# Patient Record
Sex: MALE | Race: WHITE | NOT HISPANIC OR LATINO
[De-identification: names, ages, dates, MRNs, and addresses within clinical notes are randomized per-mention and may not be internally consistent; named-entity substitution may affect disease eponyms.]

---

## 2017-01-25 ENCOUNTER — RX RENEWAL (OUTPATIENT)
Age: 72
End: 2017-01-25

## 2017-02-03 ENCOUNTER — APPOINTMENT (OUTPATIENT)
Dept: INTERNAL MEDICINE | Facility: CLINIC | Age: 72
End: 2017-02-03

## 2017-02-03 VITALS
HEIGHT: 71 IN | BODY MASS INDEX: 25.62 KG/M2 | RESPIRATION RATE: 14 BRPM | HEART RATE: 68 BPM | WEIGHT: 183 LBS | SYSTOLIC BLOOD PRESSURE: 130 MMHG | DIASTOLIC BLOOD PRESSURE: 80 MMHG

## 2017-02-03 DIAGNOSIS — Z87.891 PERSONAL HISTORY OF NICOTINE DEPENDENCE: ICD-10-CM

## 2017-04-21 ENCOUNTER — RX RENEWAL (OUTPATIENT)
Age: 72
End: 2017-04-21

## 2017-05-20 ENCOUNTER — EMERGENCY (EMERGENCY)
Facility: HOSPITAL | Age: 72
LOS: 1 days | Discharge: HOME | End: 2017-05-20
Attending: EMERGENCY MEDICINE | Admitting: EMERGENCY MEDICINE
Payer: COMMERCIAL

## 2017-05-20 ENCOUNTER — EMERGENCY (EMERGENCY)
Facility: HOSPITAL | Age: 72
LOS: 1 days | Discharge: HOME | End: 2017-05-20
Admitting: EMERGENCY MEDICINE
Payer: COMMERCIAL

## 2017-05-20 VITALS
RESPIRATION RATE: 16 BRPM | OXYGEN SATURATION: 100 % | DIASTOLIC BLOOD PRESSURE: 64 MMHG | SYSTOLIC BLOOD PRESSURE: 115 MMHG | HEART RATE: 62 BPM

## 2017-05-20 DIAGNOSIS — S80.11XA CONTUSION OF RIGHT LOWER LEG, INITIAL ENCOUNTER: ICD-10-CM

## 2017-05-20 DIAGNOSIS — R07.9 CHEST PAIN, UNSPECIFIED: ICD-10-CM

## 2017-05-20 DIAGNOSIS — I25.10 ATHEROSCLEROTIC HEART DISEASE OF NATIVE CORONARY ARTERY WITHOUT ANGINA PECTORIS: ICD-10-CM

## 2017-05-20 DIAGNOSIS — Z79.82 LONG TERM (CURRENT) USE OF ASPIRIN: ICD-10-CM

## 2017-05-20 DIAGNOSIS — K21.9 GASTRO-ESOPHAGEAL REFLUX DISEASE WITHOUT ESOPHAGITIS: ICD-10-CM
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
## 2017-05-20 DIAGNOSIS — I10 ESSENTIAL (PRIMARY) HYPERTENSION: ICD-10-CM

## 2017-05-20 DIAGNOSIS — R55 SYNCOPE AND COLLAPSE: ICD-10-CM

## 2017-05-20 DIAGNOSIS — M79.89 OTHER SPECIFIED SOFT TISSUE DISORDERS: ICD-10-CM

## 2017-05-20 DIAGNOSIS — E78.00 PURE HYPERCHOLESTEROLEMIA, UNSPECIFIED: ICD-10-CM
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
## 2017-05-20 DIAGNOSIS — S06.0X0A CONCUSSION WITHOUT LOSS OF CONSCIOUSNESS, INITIAL ENCOUNTER: ICD-10-CM

## 2017-05-20 DIAGNOSIS — F17.200 NICOTINE DEPENDENCE, UNSPECIFIED, UNCOMPLICATED: ICD-10-CM
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
## 2017-05-20 LAB
BASOPHILS # BLD AUTO: 0 K/UL — SIGNIFICANT CHANGE UP (ref 0–0.2)
BASOPHILS NFR BLD AUTO: 0.2 % — SIGNIFICANT CHANGE UP (ref 0–2)
CK MB BLD-MCNC: 1.7 % — SIGNIFICANT CHANGE UP (ref 0–3.5)
CK MB CFR SERPL CALC: 4.6 NG/ML — SIGNIFICANT CHANGE UP (ref 0–6.7)
CK SERPL-CCNC: 271 U/L — HIGH (ref 30–200)
EOSINOPHIL # BLD AUTO: 0.1 K/UL — SIGNIFICANT CHANGE UP (ref 0–0.5)
EOSINOPHIL NFR BLD AUTO: 1 % — SIGNIFICANT CHANGE UP (ref 0–6)
HCT VFR BLD CALC: 29 % — LOW (ref 39–50)
HGB BLD-MCNC: 9.9 G/DL — LOW (ref 13–17)
LYMPHOCYTES # BLD AUTO: 0.8 K/UL — LOW (ref 1–3.3)
LYMPHOCYTES # BLD AUTO: 14.5 % — SIGNIFICANT CHANGE UP (ref 13–44)
MCHC RBC-ENTMCNC: 33.5 PG — SIGNIFICANT CHANGE UP (ref 27–34)
MCHC RBC-ENTMCNC: 34.1 GM/DL — SIGNIFICANT CHANGE UP (ref 32–36)
MCV RBC AUTO: 98.1 FL — SIGNIFICANT CHANGE UP (ref 80–100)
MONOCYTES # BLD AUTO: 0.6 K/UL — SIGNIFICANT CHANGE UP (ref 0–0.9)
MONOCYTES NFR BLD AUTO: 10.8 % — SIGNIFICANT CHANGE UP (ref 2–14)
NEUTROPHILS # BLD AUTO: 3.9 K/UL — SIGNIFICANT CHANGE UP (ref 1.8–7.4)
NEUTROPHILS NFR BLD AUTO: 73.4 % — SIGNIFICANT CHANGE UP (ref 43–77)
PLATELET # BLD AUTO: 124 K/UL — LOW (ref 150–400)
RBC # BLD: 2.96 M/UL — LOW (ref 4.2–5.8)
RBC # FLD: 13.2 % — SIGNIFICANT CHANGE UP (ref 10.3–14.5)
TROPONIN T SERPL-MCNC: <0.01 NG/ML — SIGNIFICANT CHANGE UP (ref 0–0.06)
WBC # BLD: 5.3 K/UL — SIGNIFICANT CHANGE UP (ref 3.8–10.5)
WBC # FLD AUTO: 5.3 K/UL — SIGNIFICANT CHANGE UP (ref 3.8–10.5)

## 2017-05-20 PROCEDURE — 71010: CPT | Mod: 26

## 2017-05-20 PROCEDURE — 99291 CRITICAL CARE FIRST HOUR: CPT

## 2017-05-20 PROCEDURE — 72170 X-RAY EXAM OF PELVIS: CPT | Mod: 26

## 2017-05-20 PROCEDURE — 72125 CT NECK SPINE W/O DYE: CPT | Mod: 26

## 2017-05-20 PROCEDURE — 74177 CT ABD & PELVIS W/CONTRAST: CPT | Mod: 26

## 2017-05-20 PROCEDURE — 93010 ELECTROCARDIOGRAM REPORT: CPT

## 2017-05-20 PROCEDURE — 70450 CT HEAD/BRAIN W/O DYE: CPT | Mod: 26

## 2017-05-20 PROCEDURE — 99220: CPT | Mod: 25

## 2017-05-20 PROCEDURE — 71260 CT THORAX DX C+: CPT | Mod: 26

## 2017-05-20 RX ORDER — SODIUM CHLORIDE 9 MG/ML
3 INJECTION INTRAMUSCULAR; INTRAVENOUS; SUBCUTANEOUS EVERY 12 HOURS
Qty: 0 | Refills: 0 | Status: DISCONTINUED | OUTPATIENT
Start: 2017-05-20 | End: 2017-05-24

## 2017-05-20 RX ORDER — ACETAMINOPHEN 500 MG
1000 TABLET ORAL ONCE
Qty: 0 | Refills: 0 | Status: COMPLETED | OUTPATIENT
Start: 2017-05-20 | End: 2017-05-20

## 2017-05-20 RX ORDER — PANTOPRAZOLE SODIUM 20 MG/1
40 TABLET, DELAYED RELEASE ORAL
Qty: 0 | Refills: 0 | Status: DISCONTINUED | OUTPATIENT
Start: 2017-05-20 | End: 2017-05-24

## 2017-05-20 RX ORDER — OXYCODONE HYDROCHLORIDE 5 MG/1
5 TABLET ORAL EVERY 6 HOURS
Qty: 0 | Refills: 0 | Status: DISCONTINUED | OUTPATIENT
Start: 2017-05-20 | End: 2017-05-21

## 2017-05-20 RX ORDER — ATORVASTATIN CALCIUM 80 MG/1
40 TABLET, FILM COATED ORAL AT BEDTIME
Qty: 0 | Refills: 0 | Status: DISCONTINUED | OUTPATIENT
Start: 2017-05-20 | End: 2017-05-24

## 2017-05-20 RX ADMIN — ATORVASTATIN CALCIUM 40 MILLIGRAM(S): 80 TABLET, FILM COATED ORAL at 21:46

## 2017-05-20 RX ADMIN — Medication 1 TABLET(S): at 21:46

## 2017-05-20 RX ADMIN — Medication 1000 MILLIGRAM(S): at 18:55

## 2017-05-20 RX ADMIN — OXYCODONE HYDROCHLORIDE 5 MILLIGRAM(S): 5 TABLET ORAL at 23:51

## 2017-05-20 RX ADMIN — Medication 400 MILLIGRAM(S): at 18:15

## 2017-05-20 RX ADMIN — SODIUM CHLORIDE 3 MILLILITER(S): 9 INJECTION INTRAMUSCULAR; INTRAVENOUS; SUBCUTANEOUS at 22:28

## 2017-05-20 RX ADMIN — OXYCODONE HYDROCHLORIDE 5 MILLIGRAM(S): 5 TABLET ORAL at 22:27

## 2017-05-20 NOTE — ED CDU PROVIDER NOTE - PROGRESS NOTE DETAILS
CDU NOTE PEPE See: pt resting comfortably, pain controlled after most recent pain medication. no dizziness/cp/sob/weakness. NAD VSS. no events on tele. R side hematoma/ecchymosis noted, pt given ice packs to apply. CDU NOTE PEPE See: pt resting comfortably, c/o pain at hematoma site. NAD VSS. no events on tele. pain medication ordered. CDU NOTE PEPE See: pt resting oxycodone helped with pain, but now reports 5/10 pain at hematoma site. NAD VSS. no events on tele. ambulating well without issue. tylenol ordered. CDU NOTE PEPE See: pt resting c/o generalized soreness at R gluteal area/hip area at area of ecchymosis. NAD. swelling and ecchymosis to R gluteal area and side area unchanged. pain medication ordered. CDU NOTE PA Mayi: pt asleep. NAD VSS. Patient resting in bed comfortably. No distress, no complaints. Vital Signs Stable. No events on telemetry monitor. CARLOS Cullen Attending MD Lozada: I have personally performed a face to face diagnostic evaluation on this patient.  I have reviewed the ACP note and agree with the history, exam, and plan of care, except as noted.  71M with PMH including CAD s/p stent, spinal fusion surgery in the CDU for serial Hbs, trops and monitoring after syncopal episode at [facility] during an evaluation for a fall from a horse.  Reports that he was horseback riding wearing a helmet when he fell from the horse onto his R buttock.  Reports he ambulated after incident and it wasn't until he noted that his pants were tight on the R side and his wife saw a bruise on his R hip that he decided to go to the ED.  Reports CTs were completed and during workup when went to stand, saw flashing lights, became lightheaded, family reports raymond to 30, hypotensive and syncopized.  Was given Atropine and transferred to [facility].  Here has persistent R hip/buttock pain but denies chest pain, shortness of breath, palpitations.  Denies headache, change in vision, tinnitus.  Denies abdominal pain, nausea, vomiting, diarrhea, blood in stools. Denies loss of urinary or bowel continence. On monitor overnight,  min HR 50s. On exam, NAD, resting comfortably, head NCAT, PERRL, FROM at neck, no tenderness to palpation or stepoffs along length of spine, well healed surgical scar in lower back, lungs CTAB with good inspiratory effort, no crepitus or ecchymosis of chest, +S1S2, no m/r/g, abdomen soft with +BS, NT, ND, no CVAT, no ecchymosis including periumbilically, moving all extremities with 5/5 strength bilateral upper and lower extremities, good and equal  strength bilaterally, +ecchymosis at R hip extending into R lower flank, +tenderness to palpation and edema to the same area; Plan: Extensive return to ED instructions given, patient to follow up with his PMD, his cardiologist and his hematologist.  oing wello acute issues at  this time.  Lab and radiology tests reviewed with patient and family.  Patient stable for discharge. Attending MD Lozada: (CONTINUED)   Attending MD Lozada: Patient doing well, eager to leave.  No acute issues at  this time.  Lab and radiology tests reviewed with patient and family.  Patient stable for discharge. Follow up instructions given, importance of follow up emphasized, return to ED parameters reviewed and patient verbalized understanding.  All questions answered, all concerns addressed. Attending MD Lozada: I have personally performed a face to face diagnostic evaluation on this patient.  I have reviewed the ACP note and agree with the history, exam, and plan of care, except as noted.  71M with PMH including CAD s/p stent, spinal fusion surgery in the CDU for serial Hbs, trops and monitoring after syncopal episode at [facility] during an evaluation for a fall from a horse.  Reports that he was horseback riding wearing a helmet when he fell from the horse onto his R buttock.  Reports he ambulated after incident and it wasn't until he noted that his pants were tight on the R side and his wife saw a bruise on his R hip that he decided to go to the ED.  Reports CTs were completed and during workup when went to stand, saw flashing lights, became lightheaded, family reports raymond to 30, hypotensive and syncopized.  Was given Atropine and transferred to [facility].  Here has persistent R hip/buttock pain but denies chest pain, shortness of breath, palpitations.  Denies headache, change in vision, tinnitus.  Denies abdominal pain, nausea, vomiting, diarrhea, blood in stools. Denies loss of urinary or bowel continence. On monitor overnight,  min HR 50s. On exam, NAD, resting comfortably, head NCAT, PERRL, FROM at neck, no tenderness to palpation or stepoffs along length of spine, well healed surgical scar in lower back, lungs CTAB with good inspiratory effort, no crepitus or ecchymosis of chest, +S1S2, no m/r/g, abdomen soft with +BS, NT, ND, no CVAT, no ecchymosis including periumbilically, moving all extremities with 5/5 strength bilateral upper and lower extremities, good and equal  strength bilaterally, +ecchymosis at R hip extending into R lower flank, +tenderness to palpation and edema to the same area; Plan: Extensive return to ED instructions given, patient to follow up with his PMD, his cardiologist and his hematologist.

## 2017-05-20 NOTE — ED ADULT NURSE REASSESSMENT NOTE - NS ED NURSE REASSESS COMMENT FT1
Received pt from [facility], received pt alert and responsive, oriented x4, denies any respiratory distress, SOB, or difficulty breathing. Pt transferred to CDU for observation for fall/ syncopy, pt currently denies CP, SOB, diaphoresis, dizziness, lightheadedness, N/V, F/C, denies heart palpitations at this time. Neuro intact. Ecchymosis noted to R flank/back. Pt pending 2nd CE with EKG and CBC at 12am, will closely monitor on telemetry pt is currently SR on tele with hr in the 70's. IV in place, patent and free of signs of infiltration, V/S stable, pt afebrile, pt denies pain at this time. Pt educated on unit and unit rules, instructed patient to notify RN of any needed assistance, Pt verbalizes understanding, Call bell placed within reach. Safety maintained. Will continue to monitor. Daughter and spouse at bedside, meal provided.

## 2017-05-20 NOTE — ED PROVIDER NOTE - OBJECTIVE STATEMENT
This is a 71M with a history of CAD s/p 3x stents, GERD, HLD, Spinal fusion, and OA of R shoulder who presents from [place] after episode of bradycardia and hypotension. Pt reports that this morning went horseback riding and fell off his horse on to his behind and may have hit the back of his head. He reports wearing a helmet. Pt states that after he fell he could move all extremities and got back onto horse. After he went home he started to feel pain on his R hip and L chest pain and then went to [place]. There he had numerous scans which mainly showed Hematoma of R hip/buttock. While there he sat up and became bradycardic, hypotensive and required atropine and was transferred to [facility] for further evaluation. Pt now reports continued hip and chest pain. 5/10 which he attributes to fall. He denies nausea, vomiting, fevers, chills, abdominal pain.     PMD.

## 2017-05-20 NOTE — ED CDU PROVIDER NOTE - OBJECTIVE STATEMENT
71M with a history of CAD s/p 3x stents, GERD, HLD, Spinal fusion, and OA of R shoulder. This morning he went horseback riding and fell off his horse on to his right buttock and may have hit the back of his head. He reports wearing a helmet. Pt states that after he fell he could move all extremities and got back onto horse. After he went home he started to feel pain on his R hip and L chest pain and then went to [place] [facility]. There he had numerous scans, including head CT which mainly showed Hematoma of R hip/buttock. While there he sat up and became bradycardic, diaphoretic, pale, hypotensive and required atropine and was transferred to [facility] for further evaluation. Pt now reports continued hip and chest pain. 5/10 which he attributes to fall. He denies nausea, vomiting, fevers, chills, abdominal pain.

## 2017-05-20 NOTE — ED CDU PROVIDER NOTE - PLAN OF CARE
1. Stay hydrated. apply ice to areas of injury 20 mins on, 40 mins off in cycle.   2. Continue Current Home Medications. Take Tylenol 650mg every 4-6hrs for pain as needed. Take oxycodone 1 tab every 6hrs for severe pain as needed- don't drive after.   3. Follow up with your PCP in 1-2 days (Bring printed results to your doctor visit).  4. Return if symptoms, worsen, fever, weakness, chest pain, difficulty breathing, dizziness and all other concerns.

## 2017-05-20 NOTE — ED PROVIDER NOTE - MEDICAL DECISION MAKING DETAILS
71M with CAD, HLD, GERD sent from [place] after trauma workup. Pt apparently fell today off the horse and has R gluteal hematoma. he had multiple CT scans including abdomen, head and neck with xrays which were all negative apparently had episode of symptomatic bradycardia and transferred to [facility] for evaluation. Pt is assymptomatic. EKG shows sinus raymond @ 54bpm. No chest pain or SOB. Will recheck CBC and Trop. Pt to go to CDU for observation. ZR

## 2017-05-20 NOTE — ED ADULT NURSE NOTE - PSH
Cataract    Hernia, Inguinal    Prostate  Green light laser 8/8/11  Status Post Lumbar Spinal Fusion    Stented Coronary Artery

## 2017-05-20 NOTE — ED ADULT NURSE NOTE - CHPI ED SYMPTOMS NEG
no shortness of breath/no vomiting/no cough/no nausea/no back pain/no fever/no chills/no diaphoresis/no dizziness

## 2017-05-20 NOTE — ED ADULT NURSE NOTE - OBJECTIVE STATEMENT
Male 71 years old with history of Spinal Fusion, 3 cardiac stents was brought in by EMS from [facility] s/p fall from  a horse this morning. Denies LOC, N/V, headache and dizziness and change of vision. Pt stated he went home after the fall, but after a few hours he felt hip/back pain, chest pain and funny feeling of his head. Went to [place] ER and his HR was only 40 and BP was very low. He was given NS 2 liters and Atropine 0.5 mg. +right hip tenderness and hematoma. PERRL. Alert and orientedx3. All extremities mobile. Will monitor.

## 2017-05-20 NOTE — ED CDU PROVIDER NOTE - MEDICAL DECISION MAKING DETAILS
71M with CAD, HLD, GERD sent from [place] after trauma workup. Pt apparently fell today off the horse and has R gluteal hematoma. he had multiple CT scans including abdomen, head and neck with xrays which were all negative apparently had episode of symptomatic bradycardia and transferred to [facility] for evaluation. Pt is assymptomatic. EKG shows sinus raymond @ 54bpm. No chest pain or SOB. Will admit to CDU for observation with tele. Will repeat another Trop and h/h and reevaluate ZR

## 2017-05-21 VITALS
RESPIRATION RATE: 17 BRPM | SYSTOLIC BLOOD PRESSURE: 125 MMHG | TEMPERATURE: 98 F | DIASTOLIC BLOOD PRESSURE: 66 MMHG | HEART RATE: 60 BPM | OXYGEN SATURATION: 97 %

## 2017-05-21 LAB
HCT VFR BLD CALC: 28.6 % — LOW (ref 39–50)
HCT VFR BLD CALC: 28.8 % — LOW (ref 39–50)
HGB BLD-MCNC: 9.7 G/DL — LOW (ref 13–17)
HGB BLD-MCNC: 9.9 G/DL — LOW (ref 13–17)
MCHC RBC-ENTMCNC: 33.4 PG — SIGNIFICANT CHANGE UP (ref 27–34)
MCHC RBC-ENTMCNC: 33.6 GM/DL — SIGNIFICANT CHANGE UP (ref 32–36)
MCHC RBC-ENTMCNC: 34.2 PG — HIGH (ref 27–34)
MCHC RBC-ENTMCNC: 34.6 GM/DL — SIGNIFICANT CHANGE UP (ref 32–36)
MCV RBC AUTO: 98.8 FL — SIGNIFICANT CHANGE UP (ref 80–100)
MCV RBC AUTO: 99.3 FL — SIGNIFICANT CHANGE UP (ref 80–100)
PLATELET # BLD AUTO: 112 K/UL — LOW (ref 150–400)
PLATELET # BLD AUTO: 115 K/UL — LOW (ref 150–400)
RBC # BLD: 2.9 M/UL — LOW (ref 4.2–5.8)
RBC # BLD: 2.9 M/UL — LOW (ref 4.2–5.8)
RBC # FLD: 13.3 % — SIGNIFICANT CHANGE UP (ref 10.3–14.5)
RBC # FLD: 13.3 % — SIGNIFICANT CHANGE UP (ref 10.3–14.5)
TROPONIN T SERPL-MCNC: <0.01 NG/ML — SIGNIFICANT CHANGE UP (ref 0–0.06)
WBC # BLD: 4 K/UL — SIGNIFICANT CHANGE UP (ref 3.8–10.5)
WBC # BLD: 4.2 K/UL — SIGNIFICANT CHANGE UP (ref 3.8–10.5)
WBC # FLD AUTO: 4 K/UL — SIGNIFICANT CHANGE UP (ref 3.8–10.5)
WBC # FLD AUTO: 4.2 K/UL — SIGNIFICANT CHANGE UP (ref 3.8–10.5)

## 2017-05-21 PROCEDURE — 82550 ASSAY OF CK (CPK): CPT

## 2017-05-21 PROCEDURE — 80048 BASIC METABOLIC PNL TOTAL CA: CPT

## 2017-05-21 PROCEDURE — 71045 X-RAY EXAM CHEST 1 VIEW: CPT

## 2017-05-21 PROCEDURE — 84484 ASSAY OF TROPONIN QUANT: CPT

## 2017-05-21 PROCEDURE — 96361 HYDRATE IV INFUSION ADD-ON: CPT

## 2017-05-21 PROCEDURE — 83874 ASSAY OF MYOGLOBIN: CPT

## 2017-05-21 PROCEDURE — 74177 CT ABD & PELVIS W/CONTRAST: CPT

## 2017-05-21 PROCEDURE — 85610 PROTHROMBIN TIME: CPT

## 2017-05-21 PROCEDURE — 99284 EMERGENCY DEPT VISIT MOD MDM: CPT | Mod: 25

## 2017-05-21 PROCEDURE — 72125 CT NECK SPINE W/O DYE: CPT

## 2017-05-21 PROCEDURE — 96374 THER/PROPH/DIAG INJ IV PUSH: CPT

## 2017-05-21 PROCEDURE — 82962 GLUCOSE BLOOD TEST: CPT

## 2017-05-21 PROCEDURE — 81003 URINALYSIS AUTO W/O SCOPE: CPT

## 2017-05-21 PROCEDURE — 82553 CREATINE MB FRACTION: CPT

## 2017-05-21 PROCEDURE — 85730 THROMBOPLASTIN TIME PARTIAL: CPT

## 2017-05-21 PROCEDURE — 96374 THER/PROPH/DIAG INJ IV PUSH: CPT | Mod: XU

## 2017-05-21 PROCEDURE — 70450 CT HEAD/BRAIN W/O DYE: CPT

## 2017-05-21 PROCEDURE — 85027 COMPLETE CBC AUTOMATED: CPT

## 2017-05-21 PROCEDURE — G0378: CPT

## 2017-05-21 PROCEDURE — 93005 ELECTROCARDIOGRAM TRACING: CPT

## 2017-05-21 PROCEDURE — 99217: CPT

## 2017-05-21 PROCEDURE — 96375 TX/PRO/DX INJ NEW DRUG ADDON: CPT | Mod: XU

## 2017-05-21 PROCEDURE — 99291 CRITICAL CARE FIRST HOUR: CPT | Mod: 25

## 2017-05-21 PROCEDURE — 72170 X-RAY EXAM OF PELVIS: CPT

## 2017-05-21 PROCEDURE — 71260 CT THORAX DX C+: CPT

## 2017-05-21 RX ORDER — ACETAMINOPHEN 500 MG
975 TABLET ORAL EVERY 6 HOURS
Qty: 0 | Refills: 0 | Status: DISCONTINUED | OUTPATIENT
Start: 2017-05-21 | End: 2017-05-24

## 2017-05-21 RX ORDER — OXYCODONE HYDROCHLORIDE 5 MG/1
5 TABLET ORAL ONCE
Qty: 0 | Refills: 0 | Status: DISCONTINUED | OUTPATIENT
Start: 2017-05-21 | End: 2017-05-21

## 2017-05-21 RX ORDER — OXYCODONE HYDROCHLORIDE 5 MG/1
1 TABLET ORAL
Qty: 9 | Refills: 0 | OUTPATIENT
Start: 2017-05-21

## 2017-05-21 RX ADMIN — OXYCODONE HYDROCHLORIDE 5 MILLIGRAM(S): 5 TABLET ORAL at 03:09

## 2017-05-21 RX ADMIN — OXYCODONE HYDROCHLORIDE 5 MILLIGRAM(S): 5 TABLET ORAL at 07:28

## 2017-05-21 RX ADMIN — OXYCODONE HYDROCHLORIDE 5 MILLIGRAM(S): 5 TABLET ORAL at 04:09

## 2017-05-21 RX ADMIN — Medication 975 MILLIGRAM(S): at 00:51

## 2017-05-22 ENCOUNTER — APPOINTMENT (OUTPATIENT)
Dept: INTERNAL MEDICINE | Facility: CLINIC | Age: 72
End: 2017-05-22

## 2017-05-22 ENCOUNTER — TRANSCRIPTION ENCOUNTER (OUTPATIENT)
Age: 72
End: 2017-05-22

## 2017-05-22 VITALS
HEART RATE: 70 BPM | BODY MASS INDEX: 25.76 KG/M2 | DIASTOLIC BLOOD PRESSURE: 74 MMHG | HEIGHT: 71 IN | RESPIRATION RATE: 14 BRPM | SYSTOLIC BLOOD PRESSURE: 127 MMHG | TEMPERATURE: 98.3 F | WEIGHT: 184 LBS

## 2017-05-25 ENCOUNTER — EMERGENCY (EMERGENCY)
Facility: HOSPITAL | Age: 72
LOS: 1 days | Discharge: HOME | End: 2017-05-25
Admitting: EMERGENCY MEDICINE
Payer: COMMERCIAL

## 2017-05-25 DIAGNOSIS — Z79.82 LONG TERM (CURRENT) USE OF ASPIRIN: ICD-10-CM

## 2017-05-25 DIAGNOSIS — S30.0XXA CONTUSION OF LOWER BACK AND PELVIS, INITIAL ENCOUNTER: ICD-10-CM
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
## 2017-05-25 DIAGNOSIS — I25.10 ATHEROSCLEROTIC HEART DISEASE OF NATIVE CORONARY ARTERY WITHOUT ANGINA PECTORIS: ICD-10-CM

## 2017-05-25 DIAGNOSIS — D64.9 ANEMIA, UNSPECIFIED: ICD-10-CM
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
## 2017-05-25 DIAGNOSIS — Z87.891 PERSONAL HISTORY OF NICOTINE DEPENDENCE: ICD-10-CM

## 2017-05-25 DIAGNOSIS — R53.1 WEAKNESS: ICD-10-CM

## 2017-05-25 DIAGNOSIS — K21.9 GASTRO-ESOPHAGEAL REFLUX DISEASE WITHOUT ESOPHAGITIS: ICD-10-CM

## 2017-05-25 DIAGNOSIS — E78.00 PURE HYPERCHOLESTEROLEMIA, UNSPECIFIED: ICD-10-CM

## 2017-05-25 DIAGNOSIS — S06.0X0A CONCUSSION WITHOUT LOSS OF CONSCIOUSNESS, INITIAL ENCOUNTER: ICD-10-CM

## 2017-05-25 DIAGNOSIS — E78.5 HYPERLIPIDEMIA, UNSPECIFIED: ICD-10-CM
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
## 2017-05-25 PROCEDURE — 81003 URINALYSIS AUTO W/O SCOPE: CPT

## 2017-05-25 PROCEDURE — 85027 COMPLETE CBC AUTOMATED: CPT

## 2017-05-25 PROCEDURE — 86900 BLOOD TYPING SEROLOGIC ABO: CPT

## 2017-05-25 PROCEDURE — 71260 CT THORAX DX C+: CPT | Mod: 26

## 2017-05-25 PROCEDURE — 70450 CT HEAD/BRAIN W/O DYE: CPT

## 2017-05-25 PROCEDURE — 74177 CT ABD & PELVIS W/CONTRAST: CPT | Mod: 26

## 2017-05-25 PROCEDURE — 93005 ELECTROCARDIOGRAM TRACING: CPT

## 2017-05-25 PROCEDURE — 36415 COLL VENOUS BLD VENIPUNCTURE: CPT

## 2017-05-25 PROCEDURE — 85610 PROTHROMBIN TIME: CPT

## 2017-05-25 PROCEDURE — 71260 CT THORAX DX C+: CPT

## 2017-05-25 PROCEDURE — 93010 ELECTROCARDIOGRAM REPORT: CPT

## 2017-05-25 PROCEDURE — 99285 EMERGENCY DEPT VISIT HI MDM: CPT | Mod: 25

## 2017-05-25 PROCEDURE — 86850 RBC ANTIBODY SCREEN: CPT

## 2017-05-25 PROCEDURE — 83690 ASSAY OF LIPASE: CPT

## 2017-05-25 PROCEDURE — 80053 COMPREHEN METABOLIC PANEL: CPT

## 2017-05-25 PROCEDURE — 85730 THROMBOPLASTIN TIME PARTIAL: CPT

## 2017-05-25 PROCEDURE — 70450 CT HEAD/BRAIN W/O DYE: CPT | Mod: 26

## 2017-05-25 PROCEDURE — 74177 CT ABD & PELVIS W/CONTRAST: CPT

## 2017-05-25 PROCEDURE — 82150 ASSAY OF AMYLASE: CPT

## 2017-05-25 PROCEDURE — 99284 EMERGENCY DEPT VISIT MOD MDM: CPT | Mod: 25

## 2017-05-26 ENCOUNTER — APPOINTMENT (OUTPATIENT)
Dept: INTERNAL MEDICINE | Facility: CLINIC | Age: 72
End: 2017-05-26

## 2017-05-26 VITALS
RESPIRATION RATE: 14 BRPM | SYSTOLIC BLOOD PRESSURE: 128 MMHG | WEIGHT: 185 LBS | DIASTOLIC BLOOD PRESSURE: 76 MMHG | HEIGHT: 71 IN | HEART RATE: 72 BPM | BODY MASS INDEX: 25.9 KG/M2

## 2017-05-26 RX ORDER — OXYCODONE 5 MG/1
5 TABLET ORAL
Qty: 9 | Refills: 0 | Status: COMPLETED | COMMUNITY
Start: 2017-05-21

## 2017-05-26 RX ORDER — SULFAMETHOXAZOLE AND TRIMETHOPRIM 400; 80 MG/1; MG/1
400-80 TABLET ORAL
Refills: 0 | Status: DISCONTINUED | COMMUNITY
End: 2017-05-26

## 2017-05-26 RX ORDER — SULFAMETHOXAZOLE AND TRIMETHOPRIM 800; 160 MG/1; MG/1
800-160 TABLET ORAL
Qty: 30 | Refills: 0 | Status: COMPLETED | COMMUNITY
Start: 2016-03-08

## 2017-05-26 RX ORDER — EFINACONAZOLE 100 MG/ML
10 SOLUTION TOPICAL
Qty: 4 | Refills: 0 | Status: COMPLETED | COMMUNITY
Start: 2015-12-23

## 2017-06-19 ENCOUNTER — LABORATORY RESULT (OUTPATIENT)
Age: 72
End: 2017-06-19

## 2017-06-26 ENCOUNTER — APPOINTMENT (OUTPATIENT)
Dept: INTERNAL MEDICINE | Facility: CLINIC | Age: 72
End: 2017-06-26

## 2017-06-26 VITALS
HEIGHT: 71 IN | DIASTOLIC BLOOD PRESSURE: 78 MMHG | SYSTOLIC BLOOD PRESSURE: 130 MMHG | RESPIRATION RATE: 14 BRPM | BODY MASS INDEX: 25.9 KG/M2 | HEART RATE: 76 BPM | WEIGHT: 185 LBS

## 2017-06-26 RX ORDER — OXYCODONE AND ACETAMINOPHEN 5; 325 MG/1; MG/1
5-325 TABLET ORAL
Qty: 30 | Refills: 0 | Status: DISCONTINUED | COMMUNITY
Start: 2017-02-03 | End: 2017-06-26

## 2017-07-16 ENCOUNTER — RX RENEWAL (OUTPATIENT)
Age: 72
End: 2017-07-16

## 2017-07-24 ENCOUNTER — APPOINTMENT (OUTPATIENT)
Dept: INTERNAL MEDICINE | Facility: CLINIC | Age: 72
End: 2017-07-24

## 2017-07-24 VITALS
WEIGHT: 186 LBS | DIASTOLIC BLOOD PRESSURE: 74 MMHG | HEART RATE: 78 BPM | RESPIRATION RATE: 14 BRPM | BODY MASS INDEX: 26.04 KG/M2 | HEIGHT: 71 IN | SYSTOLIC BLOOD PRESSURE: 128 MMHG

## 2017-07-24 DIAGNOSIS — R49.0 DYSPHONIA: ICD-10-CM

## 2017-08-07 ENCOUNTER — APPOINTMENT (OUTPATIENT)
Dept: RADIOLOGY | Facility: HOSPITAL | Age: 72
End: 2017-08-07

## 2017-08-08 ENCOUNTER — OUTPATIENT (OUTPATIENT)
Dept: OUTPATIENT SERVICES | Facility: HOSPITAL | Age: 72
LOS: 1 days | End: 2017-08-08
Payer: COMMERCIAL

## 2017-08-08 ENCOUNTER — APPOINTMENT (OUTPATIENT)
Dept: MRI IMAGING | Facility: HOSPITAL | Age: 72
End: 2017-08-08
Payer: COMMERCIAL

## 2017-08-08 DIAGNOSIS — M75.121 COMPLETE ROTATOR CUFF TEAR OR RUPTURE OF RIGHT SHOULDER, NOT SPECIFIED AS TRAUMATIC: ICD-10-CM

## 2017-08-08 PROCEDURE — 73030 X-RAY EXAM OF SHOULDER: CPT

## 2017-08-08 PROCEDURE — 73221 MRI JOINT UPR EXTREM W/O DYE: CPT | Mod: 26,RT

## 2017-08-08 PROCEDURE — 73030 X-RAY EXAM OF SHOULDER: CPT | Mod: 26,LT

## 2017-08-08 PROCEDURE — 73221 MRI JOINT UPR EXTREM W/O DYE: CPT

## 2017-10-12 ENCOUNTER — APPOINTMENT (OUTPATIENT)
Dept: INTERNAL MEDICINE | Facility: CLINIC | Age: 72
End: 2017-10-12
Payer: COMMERCIAL

## 2017-10-12 VITALS
HEIGHT: 71 IN | WEIGHT: 187 LBS | DIASTOLIC BLOOD PRESSURE: 76 MMHG | HEART RATE: 80 BPM | RESPIRATION RATE: 15 BRPM | TEMPERATURE: 97.7 F | BODY MASS INDEX: 26.18 KG/M2 | SYSTOLIC BLOOD PRESSURE: 130 MMHG

## 2017-10-12 PROCEDURE — 99214 OFFICE O/P EST MOD 30 MIN: CPT

## 2017-10-17 ENCOUNTER — RX RENEWAL (OUTPATIENT)
Age: 72
End: 2017-10-17

## 2018-01-10 NOTE — ED CDU PROVIDER NOTE - PROGRESS NOTE3
MEDICATION REQUESTED: methylphenidate (QUILLIVANT XR) 25 MG/5ML extended-release solution    Last office visit: 11/6/2017    Next office visit: due 1/14/18    LAST REFILL: 12/1/17    
Stable.

## 2018-01-14 ENCOUNTER — RX RENEWAL (OUTPATIENT)
Age: 73
End: 2018-01-14

## 2018-04-05 ENCOUNTER — RX RENEWAL (OUTPATIENT)
Age: 73
End: 2018-04-05

## 2018-05-16 ENCOUNTER — APPOINTMENT (OUTPATIENT)
Dept: INTERNAL MEDICINE | Facility: CLINIC | Age: 73
End: 2018-05-16
Payer: COMMERCIAL

## 2018-05-16 VITALS
HEIGHT: 71 IN | DIASTOLIC BLOOD PRESSURE: 80 MMHG | HEART RATE: 76 BPM | RESPIRATION RATE: 15 BRPM | BODY MASS INDEX: 26.04 KG/M2 | WEIGHT: 186 LBS | SYSTOLIC BLOOD PRESSURE: 128 MMHG

## 2018-05-16 DIAGNOSIS — Z12.11 ENCOUNTER FOR SCREENING FOR MALIGNANT NEOPLASM OF COLON: ICD-10-CM

## 2018-05-16 PROCEDURE — 99215 OFFICE O/P EST HI 40 MIN: CPT

## 2018-05-16 RX ORDER — SULFAMETHOXAZOLE AND TRIMETHOPRIM 400; 80 MG/1; MG/1
400-80 TABLET ORAL
Refills: 0 | Status: DISCONTINUED | COMMUNITY
End: 2018-05-16

## 2018-05-16 RX ORDER — HYDROCODONE BITARTRATE AND HOMATROPINE METHYLBROMIDE 5; 1.5 MG/5ML; MG/5ML
5-1.5 SYRUP ORAL
Qty: 1 | Refills: 0 | Status: DISCONTINUED | COMMUNITY
Start: 2017-10-12 | End: 2018-05-16

## 2018-05-16 RX ORDER — FLUTICASONE PROPIONATE 50 UG/1
50 SPRAY, METERED NASAL DAILY
Qty: 1 | Refills: 3 | Status: DISCONTINUED | COMMUNITY
Start: 2017-10-12 | End: 2018-05-16

## 2018-05-16 NOTE — HISTORY OF PRESENT ILLNESS
[FreeTextEntry1] : Comes to the office for followup to review his medications discuss his overall health and to set up a colonoscopy and an upper endoscopy [de-identified] : 72-year-old man with a history of Cabrera's esophagus and GERD shoulder arthritis hyperlipidemia comes to the office for an encounter for setting up a screening colonoscopy and an upper endoscopy to reevaluate his Cabrera's acid reflux has been well controlled he has had no dysphasia no weight loss he denies abdominal pain nausea vomiting diarrhea constipation or rectal bleeding his appetite has been good and his weight has been stable he has had no chest pain shortness of breath wheezing exertional dyspnea lightheadedness dizziness vertigo or syncope he denies temperature chills sweats or myalgias

## 2018-05-16 NOTE — PHYSICAL EXAM
[No Acute Distress] : no acute distress [Well Nourished] : well nourished [Well Developed] : well developed [Well-Appearing] : well-appearing [Normal Voice/Communication] : normal voice/communication [Normal Sclera/Conjunctiva] : normal sclera/conjunctiva [PERRL] : pupils equal round and reactive to light [EOMI] : extraocular movements intact [Normal Outer Ear/Nose] : the outer ears and nose were normal in appearance [Normal Oropharynx] : the oropharynx was normal [Normal TMs] : both tympanic membranes were normal [Normal Nasal Mucosa] : the nasal mucosa was normal [No JVD] : no jugular venous distention [Supple] : supple [No Lymphadenopathy] : no lymphadenopathy [Thyroid Normal, No Nodules] : the thyroid was normal and there were no nodules present [No Respiratory Distress] : no respiratory distress  [Clear to Auscultation] : lungs were clear to auscultation bilaterally [No Accessory Muscle Use] : no accessory muscle use [Normal Rate] : normal rate  [Regular Rhythm] : with a regular rhythm [Normal S1, S2] : normal S1 and S2 [No Murmur] : no murmur heard [No Carotid Bruits] : no carotid bruits [No Abdominal Bruit] : a ~M bruit was not heard ~T in the abdomen [No Varicosities] : no varicosities [Pedal Pulses Present] : the pedal pulses are present [No Edema] : there was no peripheral edema [No Extremity Clubbing/Cyanosis] : no extremity clubbing/cyanosis [No Palpable Aorta] : no palpable aorta [Declined Breast Exam] : declined breast exam  [Soft] : abdomen soft [Non Tender] : non-tender [Non-distended] : non-distended [No Masses] : no abdominal mass palpated [No HSM] : no HSM [Normal Bowel Sounds] : normal bowel sounds [No Hernias] : no hernias [Declined Rectal Exam] : declined rectal exam [Normal Supraclavicular Nodes] : no supraclavicular lymphadenopathy [Normal Axillary Nodes] : no axillary lymphadenopathy [Normal Posterior Cervical Nodes] : no posterior cervical lymphadenopathy [Normal Femoral Nodes] : no femoral lymphadenopathy [Normal Anterior Cervical Nodes] : no anterior cervical lymphadenopathy [Normal Inguinal Nodes] : no inguinal lymphadenopathy [No CVA Tenderness] : no CVA  tenderness [No Spinal Tenderness] : no spinal tenderness [Kyphosis] : no kyphosis [Scoliosis] : no scoliosis [No Joint Swelling] : no joint swelling [Grossly Normal Strength/Tone] : grossly normal strength/tone [No Rash] : no rash [No Skin Lesions] : no skin lesions [Acne] : no acne [Normal Gait] : normal gait [Coordination Grossly Intact] : coordination grossly intact [No Focal Deficits] : no focal deficits [Deep Tendon Reflexes (DTR)] : deep tendon reflexes were 2+ and symmetric [Speech Grossly Normal] : speech grossly normal [Memory Grossly Normal] : memory grossly normal [Normal Affect] : the affect was normal [Alert and Oriented x3] : oriented to person, place, and time [Normal Mood] : the mood was normal [Normal Insight/Judgement] : insight and judgment were intact

## 2018-05-16 NOTE — ASSESSMENT
[FreeTextEntry1] : Physical exam shows a well-developed man in no acute distress his weight was 186 pounds his height was 5 foot 11 inches blood pressure 128/80 pulse is 76 and regular respiratory rate of 15 HEENT was unremarkable chest was clear cardiovascular exam shows normal S1 and S2 with no extra heart sounds or murmurs abdomen was soft and nontender extremities showed no clubbing cyanosis or edema neurologic exam was nonfocal.............. patient was given a MiraLax/Gatorade preparation for his upcoming colonoscopy the prep was reviewed and all his questions were answered he is aware of the need to have an escorted ride home stopped all blood thinning medicine for one week prior the risk of perforation and bleeding were described to the patient he is understanding we will also be doing an upper endoscopy to survey his Cabrera's esophagus with dysplasia

## 2018-05-16 NOTE — REVIEW OF SYSTEMS
[Negative] : Heme/Lymph [Nocturia] : nocturia [Joint Pain] : joint pain [Fever] : no fever [Chills] : no chills [Fatigue] : no fatigue [Recent Change In Weight] : ~T no recent weight change [Night Sweats] : no night sweats [Hot Flashes] : no hot flashes [Discharge] : no discharge [Pain] : no pain [Redness] : no redness [Dryness] : no dryness [Vision Problems] : no vision problems [Itching] : no itching [Earache] : no earache [Hearing Loss] : no hearing loss [Nosebleeds] : no nosebleeds [Hoarseness] : no hoarseness [Nasal Discharge] : no nasal discharge [Sore Throat] : no sore throat [Chest Pain] : no chest pain [Palpitations] : no palpitations [Leg Claudication] : no  leg claudication [Lower Ext Edema] : no lower extremity edema [Orthopena] : no orthopnea [Paroysmal Nocturnal Dyspnea] : no paroysmal nocturnal dyspnea [Shortness Of Breath] : no shortness of breath [Wheezing] : no wheezing [Cough] : no cough [Dyspnea on Exertion] : not dyspnea on exertion [Abdominal Pain] : no abdominal pain [Nausea] : no nausea [Constipation] : no constipation [Diarrhea] : no diarrhea [Vomiting] : no vomiting [Heartburn] : no heartburn [Melena] : no melena [Dysuria] : no dysuria [Incontinence] : no incontinence [Hesitancy] : no hesitancy [Hematuria] : no hematuria [Frequency] : no frequency [Impotence] : no impotency [Poor Libido] : libido not poor [Joint Stiffness] : no joint stiffness [Muscle Pain] : no muscle pain [Muscle Weakness] : no muscle weakness [Back Pain] : no back pain [Joint Swelling] : no joint swelling [Itching] : no itching [Mole Changes] : no mole changes [Nail Changes] : no nail changes [Hair Changes] : no hair changes [Skin Rash] : no skin rash [Headache] : no headache [Memory Loss] : no memory loss [Suicidal] : not suicidal [Insomnia] : no insomnia [Anxiety] : no anxiety [Depression] : no depression [Easy Bleeding] : no easy bleeding [Easy Bruising] : no easy bruising [Swollen Glands] : no swollen glands [FreeTextEntry9] : both shoulders

## 2018-06-04 ENCOUNTER — RX RENEWAL (OUTPATIENT)
Age: 73
End: 2018-06-04

## 2018-06-18 ENCOUNTER — RESULT REVIEW (OUTPATIENT)
Age: 73
End: 2018-06-18

## 2018-06-18 ENCOUNTER — OUTPATIENT (OUTPATIENT)
Dept: OUTPATIENT SERVICES | Facility: HOSPITAL | Age: 73
LOS: 1 days | End: 2018-06-18
Payer: COMMERCIAL

## 2018-06-18 ENCOUNTER — APPOINTMENT (OUTPATIENT)
Dept: INTERNAL MEDICINE | Facility: HOSPITAL | Age: 73
End: 2018-06-18
Payer: COMMERCIAL

## 2018-06-18 DIAGNOSIS — Z12.11 ENCOUNTER FOR SCREENING FOR MALIGNANT NEOPLASM OF COLON: ICD-10-CM

## 2018-06-18 PROCEDURE — 45378 DIAGNOSTIC COLONOSCOPY: CPT | Mod: 59

## 2018-06-18 PROCEDURE — 43239 EGD BIOPSY SINGLE/MULTIPLE: CPT

## 2018-06-18 PROCEDURE — G0105: CPT

## 2018-06-19 LAB — SURGICAL PATHOLOGY STUDY: SIGNIFICANT CHANGE UP

## 2018-07-17 ENCOUNTER — APPOINTMENT (OUTPATIENT)
Dept: ORTHOPEDIC SURGERY | Facility: CLINIC | Age: 73
End: 2018-07-17
Payer: COMMERCIAL

## 2018-07-17 VITALS
SYSTOLIC BLOOD PRESSURE: 121 MMHG | DIASTOLIC BLOOD PRESSURE: 79 MMHG | HEART RATE: 56 BPM | WEIGHT: 186 LBS | HEIGHT: 71 IN | BODY MASS INDEX: 26.04 KG/M2

## 2018-07-17 PROCEDURE — 99214 OFFICE O/P EST MOD 30 MIN: CPT | Mod: 25

## 2018-07-17 PROCEDURE — 20610 DRAIN/INJ JOINT/BURSA W/O US: CPT | Mod: 50

## 2018-10-01 ENCOUNTER — APPOINTMENT (OUTPATIENT)
Dept: ORTHOPEDIC SURGERY | Facility: CLINIC | Age: 73
End: 2018-10-01
Payer: COMMERCIAL

## 2018-10-01 VITALS — HEIGHT: 71 IN | BODY MASS INDEX: 25.9 KG/M2 | WEIGHT: 185 LBS

## 2018-10-01 PROCEDURE — 99214 OFFICE O/P EST MOD 30 MIN: CPT | Mod: 25

## 2018-10-01 PROCEDURE — 20610 DRAIN/INJ JOINT/BURSA W/O US: CPT | Mod: 50

## 2018-11-21 ENCOUNTER — EMERGENCY (EMERGENCY)
Facility: HOSPITAL | Age: 73
LOS: 1 days | Discharge: ANOTHER HOSPITAL | End: 2018-11-21
Attending: EMERGENCY MEDICINE | Admitting: EMERGENCY MEDICINE
Payer: COMMERCIAL

## 2018-11-21 ENCOUNTER — INPATIENT (INPATIENT)
Facility: HOSPITAL | Age: 73
LOS: 0 days | Discharge: HOME | DRG: 605 | End: 2018-11-22
Attending: SURGERY | Admitting: SURGERY
Payer: COMMERCIAL

## 2018-11-21 VITALS
SYSTOLIC BLOOD PRESSURE: 130 MMHG | HEART RATE: 58 BPM | OXYGEN SATURATION: 100 % | RESPIRATION RATE: 16 BRPM | DIASTOLIC BLOOD PRESSURE: 75 MMHG

## 2018-11-21 VITALS
OXYGEN SATURATION: 100 % | HEART RATE: 66 BPM | RESPIRATION RATE: 17 BRPM | TEMPERATURE: 96 F | SYSTOLIC BLOOD PRESSURE: 147 MMHG | DIASTOLIC BLOOD PRESSURE: 92 MMHG | HEIGHT: 71 IN | WEIGHT: 184.97 LBS

## 2018-11-21 VITALS
OXYGEN SATURATION: 100 % | HEART RATE: 64 BPM | SYSTOLIC BLOOD PRESSURE: 110 MMHG | RESPIRATION RATE: 15 BRPM | DIASTOLIC BLOOD PRESSURE: 64 MMHG

## 2018-11-21 DIAGNOSIS — R29.6 REPEATED FALLS: ICD-10-CM

## 2018-11-21 LAB
ALBUMIN SERPL ELPH-MCNC: 4 G/DL — SIGNIFICANT CHANGE UP (ref 3.3–5)
ALBUMIN SERPL ELPH-MCNC: 4.3 G/DL — SIGNIFICANT CHANGE UP (ref 3.3–5)
ALP SERPL-CCNC: 71 U/L — SIGNIFICANT CHANGE UP (ref 40–120)
ALP SERPL-CCNC: 87 U/L — SIGNIFICANT CHANGE UP (ref 40–120)
ALT FLD-CCNC: 24 U/L — SIGNIFICANT CHANGE UP (ref 10–45)
ALT FLD-CCNC: 40 U/L DA — SIGNIFICANT CHANGE UP (ref 10–45)
ANION GAP SERPL CALC-SCNC: 11 MMOL/L — SIGNIFICANT CHANGE UP (ref 5–17)
ANION GAP SERPL CALC-SCNC: 13 MMOL/L — SIGNIFICANT CHANGE UP (ref 5–17)
APPEARANCE UR: SIGNIFICANT CHANGE UP
APTT BLD: 27.1 SEC — LOW (ref 27.5–36.3)
AST SERPL-CCNC: 29 U/L — SIGNIFICANT CHANGE UP (ref 10–40)
AST SERPL-CCNC: 33 U/L — SIGNIFICANT CHANGE UP (ref 10–40)
BACTERIA # UR AUTO: ABNORMAL /HPF
BASOPHILS # BLD AUTO: 0 K/UL — SIGNIFICANT CHANGE UP (ref 0–0.2)
BASOPHILS # BLD AUTO: 0 K/UL — SIGNIFICANT CHANGE UP (ref 0–0.2)
BASOPHILS NFR BLD AUTO: 0.3 % — SIGNIFICANT CHANGE UP (ref 0–2)
BASOPHILS NFR BLD AUTO: 0.4 % — SIGNIFICANT CHANGE UP (ref 0–2)
BILIRUB SERPL-MCNC: 0.4 MG/DL — SIGNIFICANT CHANGE UP (ref 0.2–1.2)
BILIRUB SERPL-MCNC: 0.5 MG/DL — SIGNIFICANT CHANGE UP (ref 0.2–1.2)
BILIRUB UR-MCNC: NEGATIVE — SIGNIFICANT CHANGE UP
BUN SERPL-MCNC: 22 MG/DL — SIGNIFICANT CHANGE UP (ref 7–23)
BUN SERPL-MCNC: 24 MG/DL — HIGH (ref 7–23)
CALCIUM SERPL-MCNC: 8.5 MG/DL — SIGNIFICANT CHANGE UP (ref 8.4–10.5)
CALCIUM SERPL-MCNC: 9.2 MG/DL — SIGNIFICANT CHANGE UP (ref 8.4–10.5)
CHLORIDE SERPL-SCNC: 101 MMOL/L — SIGNIFICANT CHANGE UP (ref 96–108)
CHLORIDE SERPL-SCNC: 103 MMOL/L — SIGNIFICANT CHANGE UP (ref 96–108)
CO2 SERPL-SCNC: 22 MMOL/L — SIGNIFICANT CHANGE UP (ref 22–31)
CO2 SERPL-SCNC: 27 MMOL/L — SIGNIFICANT CHANGE UP (ref 22–31)
COLOR SPEC: YELLOW — SIGNIFICANT CHANGE UP
COMMENT - URINE: SIGNIFICANT CHANGE UP
CREAT SERPL-MCNC: 0.87 MG/DL — SIGNIFICANT CHANGE UP (ref 0.5–1.3)
CREAT SERPL-MCNC: 1.02 MG/DL — SIGNIFICANT CHANGE UP (ref 0.5–1.3)
DIFF PNL FLD: NEGATIVE — SIGNIFICANT CHANGE UP
EOSINOPHIL # BLD AUTO: 0 K/UL — SIGNIFICANT CHANGE UP (ref 0–0.5)
EOSINOPHIL # BLD AUTO: 0.1 K/UL — SIGNIFICANT CHANGE UP (ref 0–0.5)
EOSINOPHIL NFR BLD AUTO: 0.5 % — SIGNIFICANT CHANGE UP (ref 0–6)
EOSINOPHIL NFR BLD AUTO: 0.5 % — SIGNIFICANT CHANGE UP (ref 0–6)
EPI CELLS # UR: SIGNIFICANT CHANGE UP
GLUCOSE SERPL-MCNC: 107 MG/DL — HIGH (ref 70–99)
GLUCOSE SERPL-MCNC: 97 MG/DL — SIGNIFICANT CHANGE UP (ref 70–99)
GLUCOSE UR QL: NEGATIVE — SIGNIFICANT CHANGE UP
HCT VFR BLD CALC: 27.5 % — LOW (ref 39–50)
HCT VFR BLD CALC: 33.5 % — LOW (ref 39–50)
HCT VFR BLD CALC: 39.4 % — SIGNIFICANT CHANGE UP (ref 39–50)
HGB BLD-MCNC: 11.4 G/DL — LOW (ref 13–17)
HGB BLD-MCNC: 12.6 G/DL — LOW (ref 13–17)
HGB BLD-MCNC: 9.6 G/DL — LOW (ref 13–17)
INR BLD: 1.14 RATIO — SIGNIFICANT CHANGE UP (ref 0.88–1.16)
KETONES UR-MCNC: ABNORMAL
LEUKOCYTE ESTERASE UR-ACNC: NEGATIVE — SIGNIFICANT CHANGE UP
LYMPHOCYTES # BLD AUTO: 1 K/UL — SIGNIFICANT CHANGE UP (ref 1–3.3)
LYMPHOCYTES # BLD AUTO: 1.1 K/UL — SIGNIFICANT CHANGE UP (ref 1–3.3)
LYMPHOCYTES # BLD AUTO: 10.2 % — LOW (ref 13–44)
LYMPHOCYTES # BLD AUTO: 11.2 % — LOW (ref 13–44)
MCHC RBC-ENTMCNC: 29.9 PG — SIGNIFICANT CHANGE UP (ref 27–34)
MCHC RBC-ENTMCNC: 30.7 PG — SIGNIFICANT CHANGE UP (ref 27–34)
MCHC RBC-ENTMCNC: 31.6 PG — SIGNIFICANT CHANGE UP (ref 27–34)
MCHC RBC-ENTMCNC: 32 GM/DL — SIGNIFICANT CHANGE UP (ref 32–36)
MCHC RBC-ENTMCNC: 34 GM/DL — SIGNIFICANT CHANGE UP (ref 32–36)
MCHC RBC-ENTMCNC: 34.9 GM/DL — SIGNIFICANT CHANGE UP (ref 32–36)
MCV RBC AUTO: 90.2 FL — SIGNIFICANT CHANGE UP (ref 80–100)
MCV RBC AUTO: 90.7 FL — SIGNIFICANT CHANGE UP (ref 80–100)
MCV RBC AUTO: 93.2 FL — SIGNIFICANT CHANGE UP (ref 80–100)
MONOCYTES # BLD AUTO: 0.5 K/UL — SIGNIFICANT CHANGE UP (ref 0–0.9)
MONOCYTES # BLD AUTO: 0.8 K/UL — SIGNIFICANT CHANGE UP (ref 0–0.9)
MONOCYTES NFR BLD AUTO: 5.7 % — SIGNIFICANT CHANGE UP (ref 1–9)
MONOCYTES NFR BLD AUTO: 7.2 % — SIGNIFICANT CHANGE UP (ref 2–14)
NEUTROPHILS # BLD AUTO: 7.2 K/UL — SIGNIFICANT CHANGE UP (ref 1.8–7.4)
NEUTROPHILS # BLD AUTO: 8.5 K/UL — HIGH (ref 1.8–7.4)
NEUTROPHILS NFR BLD AUTO: 81.7 % — HIGH (ref 43–77)
NEUTROPHILS NFR BLD AUTO: 82.1 % — HIGH (ref 43–77)
NITRITE UR-MCNC: NEGATIVE — SIGNIFICANT CHANGE UP
PH UR: 6 — SIGNIFICANT CHANGE UP (ref 5–8)
PLATELET # BLD AUTO: 132 K/UL — LOW (ref 150–400)
PLATELET # BLD AUTO: 171 K/UL — SIGNIFICANT CHANGE UP (ref 150–400)
PLATELET # BLD AUTO: 198 K/UL — SIGNIFICANT CHANGE UP (ref 150–400)
POTASSIUM SERPL-MCNC: 4.2 MMOL/L — SIGNIFICANT CHANGE UP (ref 3.5–5.3)
POTASSIUM SERPL-MCNC: 4.3 MMOL/L — SIGNIFICANT CHANGE UP (ref 3.5–5.3)
POTASSIUM SERPL-SCNC: 4.2 MMOL/L — SIGNIFICANT CHANGE UP (ref 3.5–5.3)
POTASSIUM SERPL-SCNC: 4.3 MMOL/L — SIGNIFICANT CHANGE UP (ref 3.5–5.3)
PROT SERPL-MCNC: 6.4 G/DL — SIGNIFICANT CHANGE UP (ref 6–8.3)
PROT SERPL-MCNC: 7.8 G/DL — SIGNIFICANT CHANGE UP (ref 6–8.3)
PROT UR-MCNC: 30 MG/DL
PROTHROM AB SERPL-ACNC: 12.8 SEC — SIGNIFICANT CHANGE UP (ref 10–12.9)
RBC # BLD: 3.03 M/UL — LOW (ref 4.2–5.8)
RBC # BLD: 3.71 M/UL — LOW (ref 4.2–5.8)
RBC # BLD: 4.23 M/UL — SIGNIFICANT CHANGE UP (ref 4.2–5.8)
RBC # FLD: 15.4 % — HIGH (ref 10.3–14.5)
RBC CASTS # UR COMP ASSIST: SIGNIFICANT CHANGE UP /HPF (ref 0–4)
SODIUM SERPL-SCNC: 138 MMOL/L — SIGNIFICANT CHANGE UP (ref 135–145)
SODIUM SERPL-SCNC: 139 MMOL/L — SIGNIFICANT CHANGE UP (ref 135–145)
SP GR SPEC: 1.02 — SIGNIFICANT CHANGE UP (ref 1.01–1.02)
UROBILINOGEN FLD QL: NEGATIVE — SIGNIFICANT CHANGE UP
WBC # BLD: 10.4 K/UL — SIGNIFICANT CHANGE UP (ref 3.8–10.5)
WBC # BLD: 5.7 K/UL — SIGNIFICANT CHANGE UP (ref 3.8–10.5)
WBC # BLD: 8.7 K/UL — SIGNIFICANT CHANGE UP (ref 3.8–10.5)
WBC # FLD AUTO: 10.4 K/UL — SIGNIFICANT CHANGE UP (ref 3.8–10.5)
WBC # FLD AUTO: 5.7 K/UL — SIGNIFICANT CHANGE UP (ref 3.8–10.5)
WBC # FLD AUTO: 8.7 K/UL — SIGNIFICANT CHANGE UP (ref 3.8–10.5)
WBC UR QL: NEGATIVE /HPF — SIGNIFICANT CHANGE UP (ref 0–5)

## 2018-11-21 PROCEDURE — 81001 URINALYSIS AUTO W/SCOPE: CPT

## 2018-11-21 PROCEDURE — 71045 X-RAY EXAM CHEST 1 VIEW: CPT | Mod: 26,77

## 2018-11-21 PROCEDURE — 93005 ELECTROCARDIOGRAM TRACING: CPT

## 2018-11-21 PROCEDURE — 70450 CT HEAD/BRAIN W/O DYE: CPT

## 2018-11-21 PROCEDURE — 99285 EMERGENCY DEPT VISIT HI MDM: CPT | Mod: 25

## 2018-11-21 PROCEDURE — 80053 COMPREHEN METABOLIC PANEL: CPT

## 2018-11-21 PROCEDURE — 99291 CRITICAL CARE FIRST HOUR: CPT

## 2018-11-21 PROCEDURE — 74177 CT ABD & PELVIS W/CONTRAST: CPT

## 2018-11-21 PROCEDURE — 71045 X-RAY EXAM CHEST 1 VIEW: CPT

## 2018-11-21 PROCEDURE — 99285 EMERGENCY DEPT VISIT HI MDM: CPT

## 2018-11-21 PROCEDURE — 71260 CT THORAX DX C+: CPT | Mod: 26

## 2018-11-21 PROCEDURE — 70450 CT HEAD/BRAIN W/O DYE: CPT | Mod: 26

## 2018-11-21 PROCEDURE — 85730 THROMBOPLASTIN TIME PARTIAL: CPT

## 2018-11-21 PROCEDURE — 96376 TX/PRO/DX INJ SAME DRUG ADON: CPT | Mod: XU

## 2018-11-21 PROCEDURE — 93010 ELECTROCARDIOGRAM REPORT: CPT

## 2018-11-21 PROCEDURE — 74177 CT ABD & PELVIS W/CONTRAST: CPT | Mod: 26

## 2018-11-21 PROCEDURE — 71260 CT THORAX DX C+: CPT

## 2018-11-21 PROCEDURE — 99222 1ST HOSP IP/OBS MODERATE 55: CPT

## 2018-11-21 PROCEDURE — 96375 TX/PRO/DX INJ NEW DRUG ADDON: CPT | Mod: XU

## 2018-11-21 PROCEDURE — 71045 X-RAY EXAM CHEST 1 VIEW: CPT | Mod: 26

## 2018-11-21 PROCEDURE — 85027 COMPLETE CBC AUTOMATED: CPT

## 2018-11-21 PROCEDURE — 76705 ECHO EXAM OF ABDOMEN: CPT | Mod: 26

## 2018-11-21 PROCEDURE — 85610 PROTHROMBIN TIME: CPT

## 2018-11-21 PROCEDURE — 96374 THER/PROPH/DIAG INJ IV PUSH: CPT | Mod: XU

## 2018-11-21 RX ORDER — ONDANSETRON 8 MG/1
4 TABLET, FILM COATED ORAL EVERY 6 HOURS
Qty: 0 | Refills: 0 | Status: DISCONTINUED | OUTPATIENT
Start: 2018-11-21 | End: 2018-11-22

## 2018-11-21 RX ORDER — SODIUM CHLORIDE 9 MG/ML
2000 INJECTION INTRAMUSCULAR; INTRAVENOUS; SUBCUTANEOUS ONCE
Qty: 0 | Refills: 0 | Status: COMPLETED | OUTPATIENT
Start: 2018-11-21 | End: 2018-11-21

## 2018-11-21 RX ORDER — ONDANSETRON 8 MG/1
4 TABLET, FILM COATED ORAL ONCE
Qty: 0 | Refills: 0 | Status: DISCONTINUED | OUTPATIENT
Start: 2018-11-21 | End: 2018-11-21

## 2018-11-21 RX ORDER — ACETAMINOPHEN 500 MG
1000 TABLET ORAL ONCE
Qty: 0 | Refills: 0 | Status: COMPLETED | OUTPATIENT
Start: 2018-11-21 | End: 2018-11-21

## 2018-11-21 RX ORDER — SODIUM CHLORIDE 9 MG/ML
3 INJECTION INTRAMUSCULAR; INTRAVENOUS; SUBCUTANEOUS ONCE
Qty: 0 | Refills: 0 | Status: COMPLETED | OUTPATIENT
Start: 2018-11-21 | End: 2018-11-21

## 2018-11-21 RX ORDER — MORPHINE SULFATE 50 MG/1
4 CAPSULE, EXTENDED RELEASE ORAL ONCE
Qty: 0 | Refills: 0 | Status: DISCONTINUED | OUTPATIENT
Start: 2018-11-21 | End: 2018-11-21

## 2018-11-21 RX ORDER — ACETAMINOPHEN 500 MG
975 TABLET ORAL EVERY 6 HOURS
Qty: 0 | Refills: 0 | Status: DISCONTINUED | OUTPATIENT
Start: 2018-11-21 | End: 2018-11-22

## 2018-11-21 RX ORDER — SODIUM CHLORIDE 9 MG/ML
500 INJECTION INTRAMUSCULAR; INTRAVENOUS; SUBCUTANEOUS ONCE
Qty: 0 | Refills: 0 | Status: COMPLETED | OUTPATIENT
Start: 2018-11-21 | End: 2018-11-21

## 2018-11-21 RX ORDER — PANTOPRAZOLE SODIUM 20 MG/1
40 TABLET, DELAYED RELEASE ORAL
Qty: 0 | Refills: 0 | Status: DISCONTINUED | OUTPATIENT
Start: 2018-11-21 | End: 2018-11-22

## 2018-11-21 RX ORDER — ATORVASTATIN CALCIUM 80 MG/1
40 TABLET, FILM COATED ORAL AT BEDTIME
Qty: 0 | Refills: 0 | Status: DISCONTINUED | OUTPATIENT
Start: 2018-11-21 | End: 2018-11-22

## 2018-11-21 RX ORDER — ONDANSETRON 8 MG/1
4 TABLET, FILM COATED ORAL ONCE
Qty: 0 | Refills: 0 | Status: COMPLETED | OUTPATIENT
Start: 2018-11-21 | End: 2018-11-21

## 2018-11-21 RX ADMIN — Medication 400 MILLIGRAM(S): at 21:25

## 2018-11-21 RX ADMIN — Medication 400 MILLIGRAM(S): at 15:24

## 2018-11-21 RX ADMIN — MORPHINE SULFATE 4 MILLIGRAM(S): 50 CAPSULE, EXTENDED RELEASE ORAL at 13:01

## 2018-11-21 RX ADMIN — Medication 1000 MILLIGRAM(S): at 16:00

## 2018-11-21 RX ADMIN — Medication 1000 MILLIGRAM(S): at 21:55

## 2018-11-21 RX ADMIN — ATORVASTATIN CALCIUM 40 MILLIGRAM(S): 80 TABLET, FILM COATED ORAL at 21:25

## 2018-11-21 RX ADMIN — SODIUM CHLORIDE 9999 MILLILITER(S): 9 INJECTION INTRAMUSCULAR; INTRAVENOUS; SUBCUTANEOUS at 12:35

## 2018-11-21 RX ADMIN — ONDANSETRON 4 MILLIGRAM(S): 8 TABLET, FILM COATED ORAL at 14:01

## 2018-11-21 RX ADMIN — SODIUM CHLORIDE 500 MILLILITER(S): 9 INJECTION INTRAMUSCULAR; INTRAVENOUS; SUBCUTANEOUS at 13:00

## 2018-11-21 RX ADMIN — SODIUM CHLORIDE 3 MILLILITER(S): 9 INJECTION INTRAMUSCULAR; INTRAVENOUS; SUBCUTANEOUS at 12:35

## 2018-11-21 RX ADMIN — MORPHINE SULFATE 4 MILLIGRAM(S): 50 CAPSULE, EXTENDED RELEASE ORAL at 13:15

## 2018-11-21 RX ADMIN — MORPHINE SULFATE 4 MILLIGRAM(S): 50 CAPSULE, EXTENDED RELEASE ORAL at 13:00

## 2018-11-21 RX ADMIN — MORPHINE SULFATE 4 MILLIGRAM(S): 50 CAPSULE, EXTENDED RELEASE ORAL at 13:31

## 2018-11-21 RX ADMIN — ONDANSETRON 4 MILLIGRAM(S): 8 TABLET, FILM COATED ORAL at 12:48

## 2018-11-21 RX ADMIN — MORPHINE SULFATE 4 MILLIGRAM(S): 50 CAPSULE, EXTENDED RELEASE ORAL at 12:30

## 2018-11-21 RX ADMIN — SODIUM CHLORIDE 2000 MILLILITER(S): 9 INJECTION INTRAMUSCULAR; INTRAVENOUS; SUBCUTANEOUS at 14:15

## 2018-11-21 RX ADMIN — MORPHINE SULFATE 4 MILLIGRAM(S): 50 CAPSULE, EXTENDED RELEASE ORAL at 13:20

## 2018-11-21 NOTE — ED PROVIDER NOTE - CROS ED NEURO ALL NEG
How Severe Are Your Spot(S)?: mild
What Is The Reason For Today's Visit?: Full Body Skin Examination
What Is The Reason For Today's Visit? (Being Monitored For X): concerning skin lesions on an annual basis
negative...

## 2018-11-21 NOTE — ED PROVIDER NOTE - OBJECTIVE STATEMENT
72 year old male transferred from another hospital for fall off of horse at high speed for left flank hematoma. no loc, no head injury. c/o pain to left chest wall. had ct scan chest/a/p done at another hospital.  given pain control prior to transfer. not requiring supplemental oxygen prior to arrival.

## 2018-11-21 NOTE — ED PROVIDER NOTE - CARE PLAN
Principal Discharge DX:	Fall as cause of accidental injury in sport or athletic area as place of occurrence

## 2018-11-21 NOTE — H&P ADULT - NSHPPHYSICALEXAM_GEN_ALL_CORE
Gen: AAOx3, NAD, mentating normally  Neuro: Cranial nerves II-XII grossly intact  HEENT: Atraumatic, normocephalic  CV: Bradycardic, regular rhythm, normal S1/S2, no audible m/r/g  Pulm: Breathing comfortably on RA. Equal chest rise b/l. Lung fields CTAB  Abd: Soft, non-tender, non-distended. No rebound or guarding.  Back/flank: Left sided flank tenderness, palpable hematoma  Extremities: WWP. Moving all 4 extremities spontaneously. Strength 5/5. Sensation intact  Skin: No rashes or suspicious lesions

## 2018-11-21 NOTE — H&P ADULT - HISTORY OF PRESENT ILLNESS
72M pmhx GERD, HLD, CAD s/p stents who was brought in as a level 1 trauma activation after falling off a horse. The patient was originally brought to [facility], where labs and imaging were notable for a left buttock/flank hematoma. He was then transferred to [facility]. Of note, the patient takes ASA 81 daily for his cardiac stents.    Primary Survey:  Airway: Patent  Breathing: Bilateral breath sounds present  Circulation: BP 110s/70s, palpable distal pulses    Secondary survey only notable for a palpable and tender left buttock/flank hematoma.    Bedside FAST exam negative.

## 2018-11-21 NOTE — ED PROVIDER NOTE - MEDICAL DECISION MAKING DETAILS
pt with acute trauma left flank after being thrown from a horse case discussed with Dr. Miguel trauma and Dr. Neumann

## 2018-11-21 NOTE — CHART NOTE - NSCHARTNOTEFT_GEN_A_CORE
dr jernigan called re this patient. patient is stable. I told her patient needs to be transferred to [facility] as this is the protocol for trauma patients who are stable.

## 2018-11-21 NOTE — CHART NOTE - NSCHARTNOTEFT_GEN_A_CORE
EMERGENCY ROOM : Chart reviewed due to level I trauma activation. Per Charge RN, patient transferred from [facility] s/p fall from horse. Patient stable and alert at bedside. LMSW contacted spouse, Soco (ph. [phone]) who reports she is on her way to the ED. LMSW informed spouse that patient is medically stable to which spouse reports appreciation. Spouse reports patient independent in ADLs and ambulation prior to admission. Insurance verified as [employer] (policy #[id number]). Social Work remains available for any needs. EMERGENCY ROOM : Chart reviewed due to level I trauma activation. Per Charge RN, patient transferred from [facility] s/p fall from horse. Patient stable and alert at bedside. LMSW contacted spouse, Soco (ph. [phone]) who reports she is on her way to the ED. LMSW informed spouse that patient is medically stable to which spouse reports appreciation. Spouse reports patient independent in ADLs and ambulation prior to admission. Insurance verified as [employer] (policy #[id number]). Patient admitted to Surgery under Dr. Miguel. Level 1 downgraded. Social Work remains available for any needs.

## 2018-11-21 NOTE — H&P ADULT - ASSESSMENT
Assessment:  72M pmhx CAD s/p stents on ASA, GERD, HLD who presents as a trauma (level 1, downgraded) after falling off a horse. Found to have left flank hematoma with no evidence of active extravasation on imaging done at [place].    Plan:  - Admit to [facility] surgery under Dr. Miguel  - Repeat labs here  - CXR  - Monitor vitals  - PT     Seen and examined with Dr. Myriam Baird, PGY-2  [facility] [phone]

## 2018-11-21 NOTE — H&P ADULT - ATTENDING COMMENTS
72M on ASA, fell from horse, transferred from [place] for left gluteal hematoma.  seen and examined upon arrival as level 1 trauma, downgraded to consult.    A+Ox3  hypotensive and bradycardic (he had vasovagal response during evaluation for a similar injury in the past)  soft left gluteal hematoma without skin changes  no c-spine tenderness or limit in full active ROM    CT head - no injury  CT chest abd/pelvis - left gluteal hematoma w/o contrast blush      left gluteal hematoma  -will admit and observe for signs of recurrent bleeding since he is on ASA  -physical therapy consult  -hold ASA x 5 days 72M on ASA, fell from horse, no LOC, transferred from [place] for left gluteal hematoma.  seen and examined upon arrival as level 1 trauma, downgraded to consult.    A+Ox3  hypotensive and bradycardic (he had vasovagal response during evaluation for a similar injury in the past)  soft left gluteal hematoma without skin changes  no c-spine tenderness or limit in full active ROM    CT head - no injury  CT chest abd/pelvis - left gluteal hematoma w/o contrast blush      left gluteal hematoma  -will admit and observe for signs of recurrent bleeding since he is on ASA  -physical therapy consult  -hold ASA x 5 days

## 2018-11-21 NOTE — ED ADULT NURSE NOTE - OBJECTIVE STATEMENT
72 year old male patient BIBA transferred from [place] ED for trauma evaluation after being thrown from a horse this morning at about 1030am. Denies LOC. See paper trauma flowsheet for further documentation.

## 2018-11-21 NOTE — H&P ADULT - NSHPLABSRESULTS_GEN_ALL_CORE
Labs and CXR here pending CBC (11-21 @ 14:28)                              11.4<L>                         10.4    )----------------(  171        81.7<H>% Neutrophils, 10.2<L>% Lymphocytes, ANC: 8.5<H>                              33.5<L>  CBC (11-21 @ 12:32)                              12.6<L>                         8.7     )----------------(  198        82.1<H>% Neutrophils, 11.2<L>% Lymphocytes, ANC: 7.2                                 39.4      BMP (11-21 @ 14:28)             138     |  103     |  22    		Ca++ --      Ca 8.5                ---------------------------------( 97    		Mg --                 4.2     |  22      |  0.87  			Ph --      BMP (11-21 @ 12:32)             139     |  101     |  24<H> 		Ca++ --      Ca 9.2                ---------------------------------( 107<H>		Mg --                 4.3     |  27      |  1.02  			Ph --        LFTs (11-21 @ 14:28)      TPro 6.4 / Alb 4.0 / TBili 0.4 / DBili -- / AST 29 / ALT 24 / AlkPhos 71  LFTs (11-21 @ 12:32)      TPro 7.8 / Alb 4.3 / TBili 0.5 / DBili -- / AST 33 / ALT 40 / AlkPhos 87    Coags (11-21 @ 12:45)  aPTT 27.1<L> / INR 1.14 / PT 12.8        < from: CT Chest w/ IV Cont (11.21.18 @ 13:28) >     FINDINGS:       CERVICOTHORACIC JUNCTION AND AXILLARY REGIONS: Within normal limits.    MEDIASTINUM AND ADRIAN: No endotracheal or central endobronchial lesion.   Evidence of coronary artery calcification. No aortic dissection.   Normal-appearing central pulmonary arteries. No pericardial effusion. No   hilar or mediastinal lymphadenopathy. A small hiatal hernia.    LUNG PARENCHYMA: No pulmonary consolidation, cavitary lesion or lung mass.    PLEURA: No pleural effusion or pneumothorax.    THORACIC WALL AND OSSEOUS STRUCTURES: No evidence of any acute displaced   fracture. A few old left rib fractures are seen. Mild anterior wedging   and endplate compressions involving the lower thoracic spine are   unchanged.        LIVER: No hepatic laceration or any other significant abnormality.   Possible tiny right hepatic cyst.    GALL BLADDER AND BILE DUCTS:  Within normal limits.    PANCREAS:  Within normal limits.    SPLEEN:  No splenic laceration or subcapsular collection.    ADRENALS:  Normal.    KIDNEYS:  Small left upper pole renal cyst.    PERITONEUM AND RETROPERITONEUM:  No peritoneal thickening or stranding.   No abnormal fluid or gas collection.    GI:  No abnormal bowel distention. Normal-appearing appendix.   Uncomplicated predominantly sigmoid diverticulosis.    :  No significant abnormality involving the prostate, seminal vesicles   and the urinary bladder.    LYMPHATIC SYSTEM:  No abdominal or pelvic lymphadenopathy.    VASCULAR:  Within normal limits.    SKELETAL:  No aggressive osseous lesion. No acute displaced fracture.   Prior surgical fixation of the lumbar spine extending from L1 down to L5,   stable in comparison to the prior study.    ABDOMINAL WALL: Haziness and stranding involving the subcutaneous soft   tissues of left posterolateral abdominal wall, extending into the left   gluteal region. This may be posttraumatic. An approximately 9.7 cm sized   left paraspinous and an approximately 6.5 cm sized left gluteal hematoma.      IMPRESSION:     Soft tissue injury left posterolateral abdominal wall extending into the   gluteal region with evidence of left paraspinous and gluteal hematomas.   No other posttraumatic abnormality is demonstrated.    Additional findings as above.    < end of copied text >

## 2018-11-21 NOTE — ED PROVIDER NOTE - OBJECTIVE STATEMENT
72 year w old M hx of cad on aspirin was thrown from a  horse and landed on his left side.  Pt presents awake and alert with a huge hematoma to his left side with acute pain.  Pt denies chest pain or shortness of breath.  Pt feels mildly dizzy.

## 2018-11-21 NOTE — ED PROVIDER NOTE - PROGRESS NOTE DETAILS
patient transiently hypotensive, surgery at bedside at the time, fluids wide open and patient begins to feel better. never had mental status change, a&ox3 through event. h/h drop from 39 to 33, surgery made aware Dr. Miguel states still admit to medicine. -Margi Walton PA-C patient transiently hypotensive, surgery at bedside at the time, fluids wide open and patient begins to feel better. never had mental status change, a&ox3 through event. bedside FAST done and shows no ff,  h/h drop from 39 to 33, surgery made aware Dr. Miguel states to admit to floor. -Margi Walton PA-C

## 2018-11-21 NOTE — ED ADULT NURSE NOTE - OBJECTIVE STATEMENT
Patient presents s/p fall from a horse approx. one hour PTA. Patient states he was thrown off horse making impact with the left flank region. Large hematoma/swelling present in left flank. Patient denies LOC. He went home however came to ED due to increased swelling at left flank region. Patient takes ASA 81 mg QD.

## 2018-11-21 NOTE — ED PROVIDER NOTE - MEDICAL DECISION MAKING DETAILS
Destinee: 72 year old male transferred for fall from horse for severe pain. level 1 trauma activated upon arrival and downgraded to consult as patient has normal vitals and ct scans revealed no acute trauma. will get repeat labs, pain control, admit to trauma service for further eval.

## 2018-11-21 NOTE — ED ADULT NURSE NOTE - NSIMPLEMENTINTERV_GEN_ALL_ED
Implemented All Fall with Harm Risk Interventions:  [place] to call system. Call bell, personal items and telephone within reach. Instruct patient to call for assistance. Room bathroom lighting operational. Non-slip footwear when patient is off stretcher. Physically safe environment: no spills, clutter or unnecessary equipment. Stretcher in lowest position, wheels locked, appropriate side rails in place. Provide visual cue, wrist band, yellow gown, etc. Monitor gait and stability. Monitor for mental status changes and reorient to person, place, and time. Review medications for side effects contributing to fall risk. Reinforce activity limits and safety measures with patient and family. Provide visual clues: red socks.

## 2018-11-22 ENCOUNTER — TRANSCRIPTION ENCOUNTER (OUTPATIENT)
Age: 73
End: 2018-11-22

## 2018-11-22 VITALS — SYSTOLIC BLOOD PRESSURE: 131 MMHG | OXYGEN SATURATION: 99 % | DIASTOLIC BLOOD PRESSURE: 78 MMHG | HEART RATE: 65 BPM

## 2018-11-22 LAB
ANION GAP SERPL CALC-SCNC: 10 MMOL/L — SIGNIFICANT CHANGE UP (ref 5–17)
BUN SERPL-MCNC: 17 MG/DL — SIGNIFICANT CHANGE UP (ref 7–23)
CALCIUM SERPL-MCNC: 8.8 MG/DL — SIGNIFICANT CHANGE UP (ref 8.4–10.5)
CHLORIDE SERPL-SCNC: 107 MMOL/L — SIGNIFICANT CHANGE UP (ref 96–108)
CO2 SERPL-SCNC: 25 MMOL/L — SIGNIFICANT CHANGE UP (ref 22–31)
CREAT SERPL-MCNC: 0.86 MG/DL — SIGNIFICANT CHANGE UP (ref 0.5–1.3)
GLUCOSE SERPL-MCNC: 101 MG/DL — HIGH (ref 70–99)
HCT VFR BLD CALC: 27.9 % — LOW (ref 39–50)
HGB BLD-MCNC: 9.2 G/DL — LOW (ref 13–17)
MAGNESIUM SERPL-MCNC: 2 MG/DL — SIGNIFICANT CHANGE UP (ref 1.6–2.6)
MCHC RBC-ENTMCNC: 29.2 PG — SIGNIFICANT CHANGE UP (ref 27–34)
MCHC RBC-ENTMCNC: 33 GM/DL — SIGNIFICANT CHANGE UP (ref 32–36)
MCV RBC AUTO: 88.6 FL — SIGNIFICANT CHANGE UP (ref 80–100)
PHOSPHATE SERPL-MCNC: 2.7 MG/DL — SIGNIFICANT CHANGE UP (ref 2.5–4.5)
PLATELET # BLD AUTO: 162 K/UL — SIGNIFICANT CHANGE UP (ref 150–400)
POTASSIUM SERPL-MCNC: 4.7 MMOL/L — SIGNIFICANT CHANGE UP (ref 3.5–5.3)
POTASSIUM SERPL-SCNC: 4.7 MMOL/L — SIGNIFICANT CHANGE UP (ref 3.5–5.3)
RBC # BLD: 3.15 M/UL — LOW (ref 4.2–5.8)
RBC # FLD: 17 % — HIGH (ref 10.3–14.5)
SODIUM SERPL-SCNC: 142 MMOL/L — SIGNIFICANT CHANGE UP (ref 135–145)
WBC # BLD: 3.84 K/UL — SIGNIFICANT CHANGE UP (ref 3.8–10.5)
WBC # FLD AUTO: 3.84 K/UL — SIGNIFICANT CHANGE UP (ref 3.8–10.5)

## 2018-11-22 PROCEDURE — 99291 CRITICAL CARE FIRST HOUR: CPT | Mod: 25

## 2018-11-22 PROCEDURE — 96375 TX/PRO/DX INJ NEW DRUG ADDON: CPT

## 2018-11-22 PROCEDURE — 99232 SBSQ HOSP IP/OBS MODERATE 35: CPT

## 2018-11-22 PROCEDURE — 96374 THER/PROPH/DIAG INJ IV PUSH: CPT

## 2018-11-22 PROCEDURE — 84100 ASSAY OF PHOSPHORUS: CPT

## 2018-11-22 PROCEDURE — 85027 COMPLETE CBC AUTOMATED: CPT

## 2018-11-22 PROCEDURE — 71045 X-RAY EXAM CHEST 1 VIEW: CPT

## 2018-11-22 PROCEDURE — 97161 PT EVAL LOW COMPLEX 20 MIN: CPT

## 2018-11-22 PROCEDURE — 80048 BASIC METABOLIC PNL TOTAL CA: CPT

## 2018-11-22 PROCEDURE — 76705 ECHO EXAM OF ABDOMEN: CPT

## 2018-11-22 PROCEDURE — 80053 COMPREHEN METABOLIC PANEL: CPT

## 2018-11-22 PROCEDURE — 83735 ASSAY OF MAGNESIUM: CPT

## 2018-11-22 PROCEDURE — G0390: CPT

## 2018-11-22 RX ORDER — ACETAMINOPHEN 500 MG
3 TABLET ORAL
Qty: 0 | Refills: 0 | COMMUNITY
Start: 2018-11-22

## 2018-11-22 RX ADMIN — Medication 975 MILLIGRAM(S): at 06:28

## 2018-11-22 RX ADMIN — Medication 975 MILLIGRAM(S): at 07:00

## 2018-11-22 RX ADMIN — Medication 975 MILLIGRAM(S): at 13:57

## 2018-11-22 RX ADMIN — PANTOPRAZOLE SODIUM 40 MILLIGRAM(S): 20 TABLET, DELAYED RELEASE ORAL at 06:28

## 2018-11-22 RX ADMIN — Medication 975 MILLIGRAM(S): at 13:27

## 2018-11-22 NOTE — DISCHARGE NOTE ADULT - MEDICATION SUMMARY - MEDICATIONS TO TAKE
I will START or STAY ON the medications listed below when I get home from the hospital:    aspirin 81 mg oral tablet  -- 1 tab(s) by mouth once a day  -- Indication: For health maintenance    acetaminophen 325 mg oral tablet  -- 3 tab(s) by mouth every 6 hours, As needed, Mild Pain (1 - 3), Moderate Pain (4 - 6)  -- Indication: For pain control    Crestor  --  by mouth   -- Indication: For cholesterol    Protonix 40 mg oral delayed release tablet  -- 1 tab(s) by mouth once a day  -- Indication: For acid reflux

## 2018-11-22 NOTE — PROGRESS NOTE ADULT - ASSESSMENT
72M with hx CAD s/p stents on ASA, fell off of horse now with L flank/buttock hematoma, Hct drop 39 to 34 and brief hypotensive episode at [place].  S/p 2L bolus with improvement in pressure.    PLAN:  -  trending H/H, Hb down from 11.4-->9.6 yesterday afternoon, stable at 9.2 this AM  -  if H/H stable  -  PT consult today    Dispo: pending stable labs and PT recs    ATP SURGERY  [id number]

## 2018-11-22 NOTE — DISCHARGE NOTE ADULT - HOSPITAL COURSE
Mr. Bhandari was admitted for trauma response on 11/21/18, after falling off a horse. He was found with a Left flank injury, which was evaluated by CT scan, and was found to have no extravasation associated with a bleed. Head CT was negative for intracranial injury. His CBC declined significantly the first afternoon after admission, but was stable overnight (11->9.6->9.2). PT evaluated 11/22 and recommended no skilled needs. His vital signs were stable, showing no signs of concussive symptoms, and he was deemed stable for discharge on 11/22. No follow-up is required with Trauma Surgery.

## 2018-11-22 NOTE — PHYSICAL THERAPY INITIAL EVALUATION ADULT - ADDITIONAL COMMENTS
Spouse reports patient independent in ADLs and ambulation prior to admission. Prior to admission pt independent in ADLs and functional mobility without AD. Pt resides in house 2 steps to enter, (+) rail. 1 flight to second floor (+) rail. Pt has tub & grab bars.

## 2018-11-22 NOTE — PHYSICAL THERAPY INITIAL EVALUATION ADULT - PERTINENT HX OF CURRENT PROBLEM, REHAB EVAL
71 y/o M PMH: GERD, HLD, CAD s/p stents who was brought in as a level 1 trauma activation after falling off a horse. The patient was originally brought to [facility], where labs and imaging were notable for a left buttock/flank hematoma. He was then transferred to [facility]. Of note, the patient takes ASA 81 daily for his cardiac stents.

## 2018-11-22 NOTE — DISCHARGE NOTE ADULT - CARE PLAN
Principal Discharge DX:	Fall as cause of accidental injury in sport or athletic area as place of occurrence  Goal:	full body exam and evaluation  Assessment and plan of treatment:	rule out any injuries requiring immediate intervention, rule out bleeding

## 2018-11-22 NOTE — PROGRESS NOTE ADULT - ATTENDING COMMENTS
seen and examined 11-22-18 @ 2054    left gluteal / flank hematoma soft without skin changes    hgb - 9.6 -> 9.2 g/dL    -discharge home  -ok to restart ASA tomorrow since he has 3 coronary stents

## 2018-11-22 NOTE — DISCHARGE NOTE ADULT - PROVIDER RX CONTACT NUMBER
HISTORY: S/P LTHA, history of left hip trauma and fracture



COMPARISONS: June 09, 2018



VIEWS: 3, Frontal view of the pelvis with frontal and crosstable lateral views of the left

hip



FINDINGS:



BONE DENSITY: Normal.

BONES: The patient is status post left hip arthroplasty. There is no hardware failure or

osteolysis.

JOINTS: The patient is status post left hip arthroplasty. There is mild osteoarthritis of

the right hip.

ALIGNMENT: There is no dislocation. 

SOFT TISSUES: Unremarkable.



OTHER FINDINGS: None.



IMPRESSION: 

STATUS POST LEFT HIP ARTHROPLASTY [phone]

## 2018-11-22 NOTE — DISCHARGE NOTE ADULT - ADDITIONAL INSTRUCTIONS
PLEASE WAIT to restart home dose aspirin until tomorrow morning (Friday, 11/23/18)    FOLLOW-UP:  No need to follow-up with Trauma Surgery unless lingering issues with injuries from this incident. Dr. Miguel's clinic is available if you would like to schedule a follow-up appointment. Otherwise, please follow-up with your Primary Care Physician at some point to discuss this admission.

## 2018-11-22 NOTE — DISCHARGE NOTE ADULT - CARE PROVIDER_API CALL
Saurav Miguel), Surgery; Surgical Critical Care  [address] [id number]  Phone: [phone]  Fax: [phone]

## 2018-11-22 NOTE — DISCHARGE NOTE ADULT - PLAN OF CARE
full body exam and evaluation rule out any injuries requiring immediate intervention, rule out bleeding

## 2018-11-22 NOTE — PROGRESS NOTE ADULT - SUBJECTIVE AND OBJECTIVE BOX
[facility] SURGERY DAILY PROGRESS NOTE      SUBJECTIVE:    The patient was seen and examined at bedside this morning.  -  doing alright after fall, pain well-controlled  -  Hb trending down overnight      OBJECTIVE:    Vital Signs Last 24 Hrs  T(C): 36.7 (2018 08:18), Max: 36.8 (2018 17:20)  T(F): 98 (2018 08:18), Max: 98.3 (2018 17:20)  HR: 65 (2018 10:17) (50 - 74)  BP: 131/78 (2018 10:17) (68/45 - 136/76)  BP(mean): --  RR: 16 (2018 08:18) (14 - 19)  SpO2: 99% (2018 10:17) (96% - 100%)    I&O's Detail    2018 07:01  -  2018 07:00  --------------------------------------------------------  IN:    Oral Fluid: 700 mL  Total IN: 700 mL    OUT:    Voided: 900 mL  Total OUT: 900 mL    Total NET: -200 mL      2018 07:01  -  2018 12:59  --------------------------------------------------------  IN:    Oral Fluid: 480 mL  Total IN: 480 mL    OUT:  Total OUT: 0 mL    Total NET: 480 mL        LABS:                        9.2    3.84  )-----------( 162      ( 2018 08:29 )             27.9     11-    142  |  107  |  17  ----------------------------<  101<H>  4.7   |  25  |  0.86    Ca    8.8      2018 06:42  Phos  2.7     11-  Mg     2.0         TPro  6.4  /  Alb  4.0  /  TBili  0.4  /  DBili  x   /  AST  29  /  ALT  24  /  AlkPhos  71  11-21    PT/INR - ( 2018 12:45 )   PT: 12.8 sec;   INR: 1.14 ratio         PTT - ( 2018 12:45 )  PTT:27.1 sec  Urinalysis Basic - ( 2018 12:45 )    Color: Yellow / Appearance: slightly cloudy / S.020 / pH: x  Gluc: x / Ketone: Trace  / Bili: Negative / Urobili: Negative   Blood: x / Protein: 30 mg/dL / Nitrite: Negative   Leuk Esterase: Negative / RBC: 0-4 /HPF / WBC Negative /HPF   Sq Epi: x / Non Sq Epi: Neg.-Few / Bacteria: Trace /HPF      LIVER FUNCTIONS - ( 2018 14:28 )  Alb: 4.0 g/dL / Pro: 6.4 g/dL / ALK PHOS: 71 U/L / ALT: 24 U/L / AST: 29 U/L / GGT: x           EXAM:  Gen:       alert, in NAD  Lungs:       unlabored breathing  CV:       regular rate, rhythm  Abd:       nondistended, nontender, soft  Skin:        Left flank ecchymosis [id number] area, stable

## 2018-11-22 NOTE — DISCHARGE NOTE ADULT - PATIENT PORTAL LINK FT
You can access the [facility] Patient Portal, offered by [facility], by registering with the following website: [email or web address]

## 2018-11-23 ENCOUNTER — APPOINTMENT (OUTPATIENT)
Dept: INTERNAL MEDICINE | Facility: CLINIC | Age: 73
End: 2018-11-23
Payer: COMMERCIAL

## 2018-11-23 DIAGNOSIS — S80.10XA CONTUSION OF UNSPECIFIED LOWER LEG, INITIAL ENCOUNTER: ICD-10-CM

## 2018-11-23 PROCEDURE — 99214 OFFICE O/P EST MOD 30 MIN: CPT

## 2018-11-25 VITALS
HEIGHT: 71 IN | WEIGHT: 187 LBS | DIASTOLIC BLOOD PRESSURE: 80 MMHG | SYSTOLIC BLOOD PRESSURE: 124 MMHG | BODY MASS INDEX: 26.18 KG/M2 | RESPIRATION RATE: 15 BRPM | HEART RATE: 60 BPM

## 2018-11-25 PROBLEM — S80.10XA HEMATOMA OF LEG: Status: ACTIVE | Noted: 2018-11-23

## 2018-11-25 NOTE — PHYSICAL EXAM
[No Acute Distress] : no acute distress [Well Nourished] : well nourished [Well Developed] : well developed [Well-Appearing] : well-appearing [Normal Voice/Communication] : normal voice/communication [Normal Sclera/Conjunctiva] : normal sclera/conjunctiva [PERRL] : pupils equal round and reactive to light [EOMI] : extraocular movements intact [Normal Outer Ear/Nose] : the outer ears and nose were normal in appearance [Normal Oropharynx] : the oropharynx was normal [Normal TMs] : both tympanic membranes were normal [Normal Nasal Mucosa] : the nasal mucosa was normal [No JVD] : no jugular venous distention [Supple] : supple [No Lymphadenopathy] : no lymphadenopathy [Thyroid Normal, No Nodules] : the thyroid was normal and there were no nodules present [No Respiratory Distress] : no respiratory distress  [Clear to Auscultation] : lungs were clear to auscultation bilaterally [No Accessory Muscle Use] : no accessory muscle use [Normal Percussion] : the chest was normal to percussion [Normal Rate] : normal rate  [Regular Rhythm] : with a regular rhythm [Normal S1, S2] : normal S1 and S2 [No Murmur] : no murmur heard [No Carotid Bruits] : no carotid bruits [No Abdominal Bruit] : a ~M bruit was not heard ~T in the abdomen [No Varicosities] : no varicosities [Pedal Pulses Present] : the pedal pulses are present [No Edema] : there was no peripheral edema [No Extremity Clubbing/Cyanosis] : no extremity clubbing/cyanosis [No Palpable Aorta] : no palpable aorta [Declined Breast Exam] : declined breast exam  [Soft] : abdomen soft [Non Tender] : non-tender [Non-distended] : non-distended [No Masses] : no abdominal mass palpated [No HSM] : no HSM [Normal Bowel Sounds] : normal bowel sounds [Declined Rectal Exam] : declined rectal exam [Normal Posterior Cervical Nodes] : no posterior cervical lymphadenopathy [Normal Anterior Cervical Nodes] : no anterior cervical lymphadenopathy [No CVA Tenderness] : no CVA  tenderness [No Spinal Tenderness] : no spinal tenderness [No Joint Swelling] : no joint swelling [Grossly Normal Strength/Tone] : grossly normal strength/tone [No Rash] : no rash [Normal Gait] : normal gait [Coordination Grossly Intact] : coordination grossly intact [No Focal Deficits] : no focal deficits [Deep Tendon Reflexes (DTR)] : deep tendon reflexes were 2+ and symmetric [Normal Affect] : the affect was normal [Normal Insight/Judgement] : insight and judgment were intact [No Hernias] : no hernias [Normal Supraclavicular Nodes] : no supraclavicular lymphadenopathy [Normal Axillary Nodes] : no axillary lymphadenopathy [Normal Femoral Nodes] : no femoral lymphadenopathy [Normal Inguinal Nodes] : no inguinal lymphadenopathy [Kyphosis] : no kyphosis [Scoliosis] : no scoliosis [No Skin Lesions] : no skin lesions [Acne] : no acne [Speech Grossly Normal] : speech grossly normal [Memory Grossly Normal] : memory grossly normal [Alert and Oriented x3] : oriented to person, place, and time [Normal Mood] : the mood was normal [de-identified] : large hematoma right hip

## 2018-11-25 NOTE — HISTORY OF PRESENT ILLNESS
[___ Days ago] : [unfilled] days ago [Constant] : constant [Moderate] : moderate [Heat] : heat [Activity] : with activity [Stable] : stable [de-identified] : buttock hematoma [FreeTextEntry8] : 72-year-old man comes to the office acutely after recently being discharged from the trauma Center at [facility] after falling off a horse patient with a large hematoma on his right hip and buttock patient was monitored hemoglobin was stable and was discharged since that time his only complaint is pain at the site of the hematoma and fatigue hemoglobin on discharge was in the low 9s he denies chest pain shortness of breath exertional dyspnea lightheadedness palpitations dizziness vertigo or syncope he said no abdominal pain nausea vomiting diarrhea constipation or blood per rectum or black stools appetite has been good his weight's been stable while in the hospital extensive test of his abdomen were negative for any internal bleeding

## 2018-11-25 NOTE — ASSESSMENT
[FreeTextEntry1] : Physical exam shows a well-developed man in no acute distress his blood pressure was 124/80 height 5 foot 11 weight 187 pounds heart rate is 60 respirations are 15 HEENT was unremarkable chest was clear cardiovascular was regular abdomen was soft and nontender extremities there was a 10 x 6 cm hematoma on his right hip and thigh there was no obvious evidence of infection patient was started on iron supplementation and we will repeat a CBC this coming week patient was told to call immediately for temperature or changes in how he feels

## 2018-11-25 NOTE — HEALTH RISK ASSESSMENT
[No falls in past year] : Patient reported no falls in the past year [0] : 2) Feeling down, depressed, or hopeless: Not at all (0) [[id number]] : 0

## 2018-11-28 DIAGNOSIS — S20.212A CONTUSION OF LEFT FRONT WALL OF THORAX, INITIAL ENCOUNTER: ICD-10-CM

## 2018-11-28 LAB
ALBUMIN SERPL ELPH-MCNC: 4.3 G/DL
ALP BLD-CCNC: 74 U/L
ALT SERPL-CCNC: 25 U/L
ANION GAP SERPL CALC-SCNC: 13 MMOL/L
AST SERPL-CCNC: 26 U/L
BASOPHILS # BLD AUTO: 0.01 K/UL
BASOPHILS NFR BLD AUTO: 0.2 %
BILIRUB SERPL-MCNC: 0.8 MG/DL
BUN SERPL-MCNC: 13 MG/DL
CALCIUM SERPL-MCNC: 9.7 MG/DL
CHLORIDE SERPL-SCNC: 99 MMOL/L
CO2 SERPL-SCNC: 25 MMOL/L
CREAT SERPL-MCNC: 1.06 MG/DL
EOSINOPHIL # BLD AUTO: 0.07 K/UL
EOSINOPHIL NFR BLD AUTO: 1.6 %
GLUCOSE SERPL-MCNC: 79 MG/DL
HCT VFR BLD CALC: 28.3 %
HGB BLD-MCNC: 9 G/DL
IMM GRANULOCYTES NFR BLD AUTO: 0.5 %
LYMPHOCYTES # BLD AUTO: 0.77 K/UL
LYMPHOCYTES NFR BLD AUTO: 17.7 %
MAN DIFF?: NORMAL
MCHC RBC-ENTMCNC: 28.8 PG
MCHC RBC-ENTMCNC: 31.8 GM/DL
MCV RBC AUTO: 90.7 FL
MONOCYTES # BLD AUTO: 0.69 K/UL
MONOCYTES NFR BLD AUTO: 15.9 %
NEUTROPHILS # BLD AUTO: 2.78 K/UL
NEUTROPHILS NFR BLD AUTO: 64.1 %
PLATELET # BLD AUTO: 239 K/UL
POTASSIUM SERPL-SCNC: 4.1 MMOL/L
PROT SERPL-MCNC: 7.3 G/DL
RBC # BLD: 3.12 M/UL
RBC # FLD: 17.3 %
SODIUM SERPL-SCNC: 137 MMOL/L
WBC # FLD AUTO: 4.34 K/UL

## 2018-11-29 ENCOUNTER — FORM ENCOUNTER (OUTPATIENT)
Age: 73
End: 2018-11-29

## 2018-11-30 ENCOUNTER — APPOINTMENT (OUTPATIENT)
Dept: RADIOLOGY | Facility: HOSPITAL | Age: 73
End: 2018-11-30
Payer: COMMERCIAL

## 2018-11-30 ENCOUNTER — OUTPATIENT (OUTPATIENT)
Dept: OUTPATIENT SERVICES | Facility: HOSPITAL | Age: 73
LOS: 1 days | End: 2018-11-30
Payer: COMMERCIAL

## 2018-11-30 DIAGNOSIS — S20.212A CONTUSION OF LEFT FRONT WALL OF THORAX, INITIAL ENCOUNTER: ICD-10-CM

## 2018-11-30 PROCEDURE — 71101 X-RAY EXAM UNILAT RIBS/CHEST: CPT | Mod: 26,LT

## 2018-11-30 PROCEDURE — 71101 X-RAY EXAM UNILAT RIBS/CHEST: CPT

## 2018-12-02 ENCOUNTER — EMERGENCY (EMERGENCY)
Facility: HOSPITAL | Age: 73
LOS: 1 days | Discharge: HOME | End: 2018-12-02
Attending: EMERGENCY MEDICINE | Admitting: EMERGENCY MEDICINE
Payer: COMMERCIAL

## 2018-12-02 VITALS
WEIGHT: 184.97 LBS | SYSTOLIC BLOOD PRESSURE: 167 MMHG | TEMPERATURE: 98 F | HEIGHT: 71 IN | DIASTOLIC BLOOD PRESSURE: 83 MMHG | OXYGEN SATURATION: 100 % | RESPIRATION RATE: 16 BRPM | HEART RATE: 70 BPM

## 2018-12-02 VITALS
HEART RATE: 77 BPM | DIASTOLIC BLOOD PRESSURE: 78 MMHG | RESPIRATION RATE: 18 BRPM | OXYGEN SATURATION: 99 % | SYSTOLIC BLOOD PRESSURE: 145 MMHG

## 2018-12-02 LAB
ANION GAP SERPL CALC-SCNC: 6 MMOL/L — SIGNIFICANT CHANGE UP (ref 5–17)
ANISOCYTOSIS BLD QL: SLIGHT — SIGNIFICANT CHANGE UP
APPEARANCE UR: CLEAR — SIGNIFICANT CHANGE UP
APTT BLD: 30.1 SEC — SIGNIFICANT CHANGE UP (ref 27.5–36.3)
BACTERIA # UR AUTO: NEGATIVE /HPF — SIGNIFICANT CHANGE UP
BASOPHILS # BLD AUTO: 0 K/UL — SIGNIFICANT CHANGE UP (ref 0–0.2)
BASOPHILS NFR BLD AUTO: 0.8 % — SIGNIFICANT CHANGE UP (ref 0–2)
BILIRUB UR-MCNC: NEGATIVE — SIGNIFICANT CHANGE UP
BUN SERPL-MCNC: 14 MG/DL — SIGNIFICANT CHANGE UP (ref 7–23)
CALCIUM SERPL-MCNC: 8.8 MG/DL — SIGNIFICANT CHANGE UP (ref 8.4–10.5)
CHLORIDE SERPL-SCNC: 102 MMOL/L — SIGNIFICANT CHANGE UP (ref 96–108)
CO2 SERPL-SCNC: 29 MMOL/L — SIGNIFICANT CHANGE UP (ref 22–31)
COLOR SPEC: YELLOW — SIGNIFICANT CHANGE UP
CREAT SERPL-MCNC: 0.92 MG/DL — SIGNIFICANT CHANGE UP (ref 0.5–1.3)
DIFF PNL FLD: NEGATIVE — SIGNIFICANT CHANGE UP
EOSINOPHIL # BLD AUTO: 0.1 K/UL — SIGNIFICANT CHANGE UP (ref 0–0.5)
EOSINOPHIL NFR BLD AUTO: 2.4 % — SIGNIFICANT CHANGE UP (ref 0–6)
EPI CELLS # UR: SIGNIFICANT CHANGE UP
GLUCOSE SERPL-MCNC: 102 MG/DL — HIGH (ref 70–99)
GLUCOSE UR QL: NEGATIVE — SIGNIFICANT CHANGE UP
HCT VFR BLD CALC: 30.2 % — LOW (ref 39–50)
HGB BLD-MCNC: 9.7 G/DL — LOW (ref 13–17)
HYPOCHROMIA BLD QL: SLIGHT — SIGNIFICANT CHANGE UP
INR BLD: 1.22 RATIO — HIGH (ref 0.88–1.16)
KETONES UR-MCNC: NEGATIVE — SIGNIFICANT CHANGE UP
LEUKOCYTE ESTERASE UR-ACNC: ABNORMAL
LYMPHOCYTES # BLD AUTO: 0.7 K/UL — LOW (ref 1–3.3)
LYMPHOCYTES # BLD AUTO: 16.3 % — SIGNIFICANT CHANGE UP (ref 13–44)
MCHC RBC-ENTMCNC: 30.2 PG — SIGNIFICANT CHANGE UP (ref 27–34)
MCHC RBC-ENTMCNC: 32 GM/DL — SIGNIFICANT CHANGE UP (ref 32–36)
MCV RBC AUTO: 94.5 FL — SIGNIFICANT CHANGE UP (ref 80–100)
MONOCYTES # BLD AUTO: 0.6 K/UL — SIGNIFICANT CHANGE UP (ref 0–0.9)
MONOCYTES NFR BLD AUTO: 13.8 % — HIGH (ref 1–9)
NEUTROPHILS # BLD AUTO: 3 K/UL — SIGNIFICANT CHANGE UP (ref 1.8–7.4)
NEUTROPHILS NFR BLD AUTO: 66.8 % — SIGNIFICANT CHANGE UP (ref 43–77)
NITRITE UR-MCNC: NEGATIVE — SIGNIFICANT CHANGE UP
PH UR: 8 — SIGNIFICANT CHANGE UP (ref 5–8)
PLAT MORPH BLD: NORMAL — SIGNIFICANT CHANGE UP
PLATELET # BLD AUTO: 334 K/UL — SIGNIFICANT CHANGE UP (ref 150–400)
POLYCHROMASIA BLD QL SMEAR: SLIGHT — SIGNIFICANT CHANGE UP
POTASSIUM SERPL-MCNC: 4 MMOL/L — SIGNIFICANT CHANGE UP (ref 3.5–5.3)
POTASSIUM SERPL-SCNC: 4 MMOL/L — SIGNIFICANT CHANGE UP (ref 3.5–5.3)
PROT UR-MCNC: NEGATIVE — SIGNIFICANT CHANGE UP
PROTHROM AB SERPL-ACNC: 13.7 SEC — HIGH (ref 10–12.9)
RBC # BLD: 3.19 M/UL — LOW (ref 4.2–5.8)
RBC # FLD: 16.2 % — HIGH (ref 10.3–14.5)
RBC BLD AUTO: ABNORMAL
RBC CASTS # UR COMP ASSIST: NEGATIVE /HPF — SIGNIFICANT CHANGE UP (ref 0–4)
SODIUM SERPL-SCNC: 137 MMOL/L — SIGNIFICANT CHANGE UP (ref 135–145)
SP GR SPEC: 1.01 — SIGNIFICANT CHANGE UP (ref 1.01–1.02)
UROBILINOGEN FLD QL: 1
WBC # BLD: 4.5 K/UL — SIGNIFICANT CHANGE UP (ref 3.8–10.5)
WBC # FLD AUTO: 4.5 K/UL — SIGNIFICANT CHANGE UP (ref 3.8–10.5)
WBC UR QL: NEGATIVE /HPF — SIGNIFICANT CHANGE UP (ref 0–5)

## 2018-12-02 PROCEDURE — 85730 THROMBOPLASTIN TIME PARTIAL: CPT

## 2018-12-02 PROCEDURE — 85610 PROTHROMBIN TIME: CPT

## 2018-12-02 PROCEDURE — 85027 COMPLETE CBC AUTOMATED: CPT

## 2018-12-02 PROCEDURE — 99285 EMERGENCY DEPT VISIT HI MDM: CPT | Mod: 25

## 2018-12-02 PROCEDURE — 74177 CT ABD & PELVIS W/CONTRAST: CPT | Mod: 26

## 2018-12-02 PROCEDURE — 96374 THER/PROPH/DIAG INJ IV PUSH: CPT | Mod: XU

## 2018-12-02 PROCEDURE — 87086 URINE CULTURE/COLONY COUNT: CPT

## 2018-12-02 PROCEDURE — 76705 ECHO EXAM OF ABDOMEN: CPT | Mod: 26

## 2018-12-02 PROCEDURE — 80048 BASIC METABOLIC PNL TOTAL CA: CPT

## 2018-12-02 PROCEDURE — 99284 EMERGENCY DEPT VISIT MOD MDM: CPT | Mod: 25

## 2018-12-02 PROCEDURE — 81001 URINALYSIS AUTO W/SCOPE: CPT

## 2018-12-02 PROCEDURE — 74177 CT ABD & PELVIS W/CONTRAST: CPT

## 2018-12-02 PROCEDURE — 76705 ECHO EXAM OF ABDOMEN: CPT

## 2018-12-02 PROCEDURE — 96376 TX/PRO/DX INJ SAME DRUG ADON: CPT

## 2018-12-02 PROCEDURE — 96361 HYDRATE IV INFUSION ADD-ON: CPT

## 2018-12-02 RX ORDER — ROSUVASTATIN CALCIUM 5 MG/1
0 TABLET ORAL
Qty: 0 | Refills: 0 | COMMUNITY

## 2018-12-02 RX ORDER — ASPIRIN/CALCIUM CARB/MAGNESIUM 324 MG
1 TABLET ORAL
Qty: 0 | Refills: 0 | COMMUNITY

## 2018-12-02 RX ORDER — MORPHINE SULFATE 50 MG/1
4 CAPSULE, EXTENDED RELEASE ORAL ONCE
Qty: 0 | Refills: 0 | Status: DISCONTINUED | OUTPATIENT
Start: 2018-12-02 | End: 2018-12-02

## 2018-12-02 RX ORDER — PANTOPRAZOLE SODIUM 20 MG/1
1 TABLET, DELAYED RELEASE ORAL
Qty: 0 | Refills: 0 | COMMUNITY

## 2018-12-02 RX ORDER — SODIUM CHLORIDE 9 MG/ML
1000 INJECTION INTRAMUSCULAR; INTRAVENOUS; SUBCUTANEOUS
Qty: 0 | Refills: 0 | Status: DISCONTINUED | OUTPATIENT
Start: 2018-12-02 | End: 2018-12-06

## 2018-12-02 RX ADMIN — SODIUM CHLORIDE 100 MILLILITER(S): 9 INJECTION INTRAMUSCULAR; INTRAVENOUS; SUBCUTANEOUS at 11:10

## 2018-12-02 RX ADMIN — MORPHINE SULFATE 4 MILLIGRAM(S): 50 CAPSULE, EXTENDED RELEASE ORAL at 10:00

## 2018-12-02 RX ADMIN — MORPHINE SULFATE 4 MILLIGRAM(S): 50 CAPSULE, EXTENDED RELEASE ORAL at 10:54

## 2018-12-02 RX ADMIN — SODIUM CHLORIDE 100 MILLILITER(S): 9 INJECTION INTRAMUSCULAR; INTRAVENOUS; SUBCUTANEOUS at 13:09

## 2018-12-02 RX ADMIN — MORPHINE SULFATE 4 MILLIGRAM(S): 50 CAPSULE, EXTENDED RELEASE ORAL at 13:09

## 2018-12-02 RX ADMIN — SODIUM CHLORIDE 1000 MILLILITER(S): 9 INJECTION INTRAMUSCULAR; INTRAVENOUS; SUBCUTANEOUS at 13:10

## 2018-12-02 RX ADMIN — SODIUM CHLORIDE 100 MILLILITER(S): 9 INJECTION INTRAMUSCULAR; INTRAVENOUS; SUBCUTANEOUS at 08:16

## 2018-12-02 RX ADMIN — MORPHINE SULFATE 4 MILLIGRAM(S): 50 CAPSULE, EXTENDED RELEASE ORAL at 09:09

## 2018-12-02 NOTE — ED PROVIDER NOTE - PHYSICAL EXAMINATION
Gen:  alert, awake, no acute distress  Head:  atraumatic, normocephalic  HEENT: PERRLA, EOMI, normal nose, normal oropharynx, no tonsillar edema, erythema, or exudate  CV:  rrr, nl S1, S2, no m/r/g; no chest wall ttp, no ttp left sided ribs  Pulm:  lungs CTA b/l  Abd: s/nt/nd, +BS  MSK:  moving all extremities, no back midline ttp, no stepoffs, + mild left cva TTP; ttp left paraspinal lumbar ttp  Neuro:  grossly intact, no focal deficits  Skin:  clear, dry, ecchymosis abdomen and mid back (nontender and improving as per wife)  Psych: AOx3, normal affect, no apparent risk to self or others Gen:  alert, awake, no acute distress  Head:  atraumatic, normocephalic  HEENT: PERRLA, EOMI, normal nose, normal oropharynx, no tonsillar edema, erythema, or exudate  CV:  rrr, nl S1, S2, no m/r/g; no chest wall ttp, no ttp left sided ribs  Pulm:  lungs CTA b/l  Abd: s/nt/nd, +BS  MSK:  moving all extremities, no back midline ttp, no stepoffs, + mild left cva TTP; ttp left paraspinal lumbar ttp  Neuro:  grossly intact, no focal deficits  Skin:  clear, dry, ecchymosis abdomen and mid back (nontender and improving as per wife); nontender ecchymosis left posterior thigh  Psych: AOx3, normal affect, no apparent risk to self or others

## 2018-12-02 NOTE — ED PROVIDER NOTE - NS ED ROS FT
Except as otherwise indicated in HPI:  CONSTITUTIONAL: Neg  HEENT: neg  CV: neg  Resp: neg  GI: Neg  : no  hematuria, no urinary symptoms; +flank pain  MSK: Neg  SKIN: Neg  NEURO: Neg  PSYCHIATRIC: Neg  Heme/Onc: Neg

## 2018-12-02 NOTE — ED PROVIDER NOTE - OBJECTIVE STATEMENT
Pt fell 10 days ago and had left flank hematoma and gluteal hematoma. Has hb drop from 11 to 9 but was stable and d/c after overnight stay at [facility]. Pt also had rib xrays and was told he has left 6th tib fx, old fx seen on ct. Pt was sleeping on his back last ngith and developed new left lower back and flank pain, no new injury. Pain sharper than had been. took 2 tramadol tabs (50mg each) and pain improved but didn't resolve. no abd pain. Pt fell 10 days ago and had left flank hematoma and gluteal hematoma. Has hb drop from 11 to 9 but was stable and d/c after overnight stay at [place]. Pt also had rib xrays and was told he has left 6th tib fx, old fx seen on ct. Pt was sleeping on his back last night and developed new left lower back and flank pain, no new injury. Pain sharper than had been. took 2 tramadol tabs (50mg each) and pain improved but didn't resolve. no abd pain.

## 2018-12-02 NOTE — ED PROVIDER NOTE - MEDICAL DECISION MAKING DETAILS
72 yo male s/p trauma last week and left flank hematoma, now with new flank and lower back pain, will check hb, us, re-assess

## 2018-12-02 NOTE — ED PROVIDER NOTE - PROGRESS NOTE DETAILS
pt c/o pain discussed findigns with pt, new left rib fx on ct and small pl effusion, stable size hematoma and stable hb, will d/c with percocet, f/u with dr mortensen, will continue incentive spirometry  case d/w dr mortensen, copies of results given to pt

## 2018-12-03 ENCOUNTER — APPOINTMENT (OUTPATIENT)
Dept: ORTHOPEDIC SURGERY | Facility: CLINIC | Age: 73
End: 2018-12-03
Payer: COMMERCIAL

## 2018-12-03 ENCOUNTER — APPOINTMENT (OUTPATIENT)
Dept: INTERNAL MEDICINE | Facility: CLINIC | Age: 73
End: 2018-12-03
Payer: COMMERCIAL

## 2018-12-03 VITALS
BODY MASS INDEX: 25.91 KG/M2 | DIASTOLIC BLOOD PRESSURE: 80 MMHG | HEIGHT: 70 IN | OXYGEN SATURATION: 97 % | WEIGHT: 181 LBS | SYSTOLIC BLOOD PRESSURE: 136 MMHG | HEART RATE: 78 BPM | TEMPERATURE: 97.8 F | RESPIRATION RATE: 18 BRPM

## 2018-12-03 VITALS — HEIGHT: 71 IN | BODY MASS INDEX: 26.18 KG/M2 | WEIGHT: 187 LBS

## 2018-12-03 DIAGNOSIS — S22.49XA MULTIPLE FRACTURES OF RIBS, UNSPECIFIED SIDE, INITIAL ENCOUNTER FOR CLOSED FRACTURE: ICD-10-CM

## 2018-12-03 DIAGNOSIS — S70.01XD CONTUSION OF RIGHT HIP, SUBSEQUENT ENCOUNTER: ICD-10-CM

## 2018-12-03 DIAGNOSIS — M75.101 UNSPECIFIED ROTATOR CUFF TEAR OR RUPTURE OF RIGHT SHOULDER, NOT SPECIFIED AS TRAUMATIC: ICD-10-CM

## 2018-12-03 DIAGNOSIS — Z84.2 FAMILY HISTORY OF OTHER DISEASES OF THE GENITOURINARY SYSTEM: ICD-10-CM

## 2018-12-03 DIAGNOSIS — D64.9 ANEMIA, UNSPECIFIED: ICD-10-CM

## 2018-12-03 DIAGNOSIS — Z80.8 FAMILY HISTORY OF MALIGNANT NEOPLASM OF OTHER ORGANS OR SYSTEMS: ICD-10-CM

## 2018-12-03 DIAGNOSIS — Z80.0 FAMILY HISTORY OF MALIGNANT NEOPLASM OF DIGESTIVE ORGANS: ICD-10-CM

## 2018-12-03 DIAGNOSIS — Z82.49 FAMILY HISTORY OF ISCHEMIC HEART DISEASE AND OTHER DISEASES OF THE CIRCULATORY SYSTEM: ICD-10-CM

## 2018-12-03 LAB
CULTURE RESULTS: NO GROWTH — SIGNIFICANT CHANGE UP
SPECIMEN SOURCE: SIGNIFICANT CHANGE UP

## 2018-12-03 PROCEDURE — 99214 OFFICE O/P EST MOD 30 MIN: CPT | Mod: 25

## 2018-12-03 PROCEDURE — 73030 X-RAY EXAM OF SHOULDER: CPT | Mod: RT

## 2018-12-03 PROCEDURE — 20610 DRAIN/INJ JOINT/BURSA W/O US: CPT | Mod: 50

## 2018-12-03 PROCEDURE — 99215 OFFICE O/P EST HI 40 MIN: CPT

## 2018-12-03 RX ORDER — TRAMADOL HYDROCHLORIDE 50 MG/1
50 TABLET, COATED ORAL 4 TIMES DAILY
Qty: 40 | Refills: 0 | Status: DISCONTINUED | COMMUNITY
Start: 2018-07-17 | End: 2018-12-03

## 2018-12-03 RX ORDER — ELECTROLYTES/DEXTROSE
SOLUTION, ORAL ORAL DAILY
Qty: 90 | Refills: 1 | Status: ACTIVE | COMMUNITY
Start: 2018-12-03

## 2018-12-03 RX ORDER — ASPIRIN 81 MG/1
81 TABLET, CHEWABLE ORAL
Qty: 90 | Refills: 1 | Status: ACTIVE | COMMUNITY

## 2018-12-03 RX ORDER — TRAMADOL HYDROCHLORIDE 50 MG/1
50 TABLET, COATED ORAL 4 TIMES DAILY
Qty: 60 | Refills: 0 | Status: DISCONTINUED | COMMUNITY
Start: 2018-10-01 | End: 2018-12-03

## 2018-12-03 RX ORDER — MULTIVIT-MIN/FOLIC/VIT K/LYCOP 400-300MCG
50 MCG TABLET ORAL
Qty: 30 | Refills: 3 | Status: ACTIVE | COMMUNITY
Start: 2018-12-03

## 2018-12-03 NOTE — ASSESSMENT
[FreeTextEntry1] : Physical exam shows a well-developed male in no acute distress his blood pressure was 136/80 height 5 foot 10 weight 181 pounds heart rate is 78 respiratory rate 18 HEENT was unremarkable chest was clear did not hear decreased breath sounds at the left base cardiovascular exam was regular abdomen soft neuro nonfocal discuss with pulmonary not concerned about small left pleural effusion seen on CAT scan but will repeat chest x-ray in a  week he will report any symptoms such as fever and shortness of breath stable hemoglobin of 9.7 and he will continue his iron supplementation

## 2018-12-03 NOTE — PHYSICAL EXAM
[No Acute Distress] : no acute distress [Well Nourished] : well nourished [Well Developed] : well developed [Well-Appearing] : well-appearing [Normal Voice/Communication] : normal voice/communication [Normal Sclera/Conjunctiva] : normal sclera/conjunctiva [PERRL] : pupils equal round and reactive to light [EOMI] : extraocular movements intact [Normal Outer Ear/Nose] : the outer ears and nose were normal in appearance [Normal Oropharynx] : the oropharynx was normal [Normal TMs] : both tympanic membranes were normal [Normal Nasal Mucosa] : the nasal mucosa was normal [No JVD] : no jugular venous distention [Supple] : supple [No Lymphadenopathy] : no lymphadenopathy [Thyroid Normal, No Nodules] : the thyroid was normal and there were no nodules present [No Respiratory Distress] : no respiratory distress  [Clear to Auscultation] : lungs were clear to auscultation bilaterally [No Accessory Muscle Use] : no accessory muscle use [Normal Percussion] : the chest was normal to percussion [Normal Rate] : normal rate  [Regular Rhythm] : with a regular rhythm [Normal S1, S2] : normal S1 and S2 [No Murmur] : no murmur heard [No Carotid Bruits] : no carotid bruits [No Abdominal Bruit] : a ~M bruit was not heard ~T in the abdomen [No Varicosities] : no varicosities [Pedal Pulses Present] : the pedal pulses are present [No Edema] : there was no peripheral edema [No Extremity Clubbing/Cyanosis] : no extremity clubbing/cyanosis [No Palpable Aorta] : no palpable aorta [Declined Breast Exam] : declined breast exam  [Soft] : abdomen soft [Non Tender] : non-tender [Non-distended] : non-distended [No Masses] : no abdominal mass palpated [No HSM] : no HSM [Normal Bowel Sounds] : normal bowel sounds [No Hernias] : no hernias [Declined Rectal Exam] : declined rectal exam [Normal Supraclavicular Nodes] : no supraclavicular lymphadenopathy [Normal Axillary Nodes] : no axillary lymphadenopathy [Normal Posterior Cervical Nodes] : no posterior cervical lymphadenopathy [Normal Anterior Cervical Nodes] : no anterior cervical lymphadenopathy [Normal Inguinal Nodes] : no inguinal lymphadenopathy [No CVA Tenderness] : no CVA  tenderness [No Spinal Tenderness] : no spinal tenderness [No Joint Swelling] : no joint swelling [Grossly Normal Strength/Tone] : grossly normal strength/tone [No Rash] : no rash [No Skin Lesions] : no skin lesions [Normal Gait] : normal gait [Coordination Grossly Intact] : coordination grossly intact [No Focal Deficits] : no focal deficits [Deep Tendon Reflexes (DTR)] : deep tendon reflexes were 2+ and symmetric [Speech Grossly Normal] : speech grossly normal [Memory Grossly Normal] : memory grossly normal [Normal Affect] : the affect was normal [Alert and Oriented x3] : oriented to person, place, and time [Normal Mood] : the mood was normal [Normal Insight/Judgement] : insight and judgment were intact [Kyphosis] : no kyphosis [Scoliosis] : no scoliosis [Acne] : no acne

## 2018-12-03 NOTE — HEALTH RISK ASSESSMENT
[Any fall with injury in past year] : Patient reported fall with injury in the past year [de-identified] : off horse rib fractures

## 2018-12-03 NOTE — REVIEW OF SYSTEMS
[Fatigue] : fatigue [Chest Pain] : chest pain [Joint Pain] : joint pain [Joint Stiffness] : joint stiffness [Back Pain] : back pain [Insomnia] : insomnia [Fever] : no fever [Chills] : no chills [Hot Flashes] : no hot flashes [Night Sweats] : no night sweats [Recent Change In Weight] : ~T no recent weight change [Discharge] : no discharge [Pain] : no pain [Redness] : no redness [Dryness] : no dryness [Vision Problems] : no vision problems [Itching] : no itching [Earache] : no earache [Hearing Loss] : no hearing loss [Nosebleeds] : no nosebleeds [Postnasal Drip] : no postnasal drip [Nasal Discharge] : no nasal discharge [Sore Throat] : no sore throat [Hoarseness] : no hoarseness [Palpitations] : no palpitations [Claudication] : no  leg claudication [Lower Ext Edema] : no lower extremity edema [Orthopena] : no orthopnea [Paroysmal Nocturnal Dyspnea] : no paroysmal nocturnal dyspnea [Shortness Of Breath] : no shortness of breath [Wheezing] : no wheezing [Cough] : no cough [Dyspnea on Exertion] : not dyspnea on exertion [Abdominal Pain] : no abdominal pain [Nausea] : no nausea [Constipation] : no constipation [Diarrhea] : no diarrhea [Vomiting] : no vomiting [Heartburn] : no heartburn [Melena] : no melena [Dysuria] : no dysuria [Incontinence] : no incontinence [Hesitancy] : no hesitancy [Nocturia] : no nocturia [Hematuria] : no hematuria [Frequency] : no frequency [Impotence] : no impotency [Poor Libido] : libido not poor [Muscle Pain] : no muscle pain [Muscle Weakness] : no muscle weakness [Mole Changes] : no mole changes [Nail Changes] : no nail changes [Hair Changes] : no hair changes [Skin Rash] : no skin rash [Headache] : no headache [Dizziness] : no dizziness [Fainting] : no fainting [Confusion] : no confusion [Unsteady Walk] : no ataxia [Memory Loss] : no memory loss [Suicidal] : not suicidal [Anxiety] : no anxiety [Depression] : no depression [Easy Bleeding] : no easy bleeding [Easy Bruising] : no easy bruising [Swollen Glands] : no swollen glands [FreeTextEntry5] : left rib fractures [FreeTextEntry9] : shoulders [de-identified] : secondary to pain

## 2018-12-03 NOTE — HISTORY OF PRESENT ILLNESS
[Spouse] : spouse [FreeTextEntry1] : Comes to the office for followup to review his medications and discuss his overall health recent formal full course was a hematoma on the right side and broken ribs on the left [de-identified] : 73 year-old man comes to the office for followup after a visit to the emergency room yesterday patient fell a full course approximately 2 weeks ago sustaining a large right hip hematoma and fractures of the sixth seventh eighth and ninth ribs on the left side the initial fractures were noted in the sixth and seventh this weekend in the emergency room a CAT scan showed fractures in the eighth and ninth ribs posterior lateral location of his pain chest x-rays have not shown a left pleural effusion this will one seen on CAT scan he denies temperature chills sweats myalgias wheezing as of breath exertional dyspnea cough denies abdominal pain nausea vomiting diarrhea constipation or blood per stools since the accident his shoulders have been bothering him and he saw his orthopedist this morning to get some injections

## 2018-12-09 ENCOUNTER — FORM ENCOUNTER (OUTPATIENT)
Age: 73
End: 2018-12-09

## 2018-12-10 ENCOUNTER — OUTPATIENT (OUTPATIENT)
Dept: OUTPATIENT SERVICES | Facility: HOSPITAL | Age: 73
LOS: 1 days | End: 2018-12-10
Payer: COMMERCIAL

## 2018-12-10 ENCOUNTER — APPOINTMENT (OUTPATIENT)
Dept: RADIOLOGY | Facility: HOSPITAL | Age: 73
End: 2018-12-10
Payer: COMMERCIAL

## 2018-12-10 DIAGNOSIS — S20.212A CONTUSION OF LEFT FRONT WALL OF THORAX, INITIAL ENCOUNTER: ICD-10-CM

## 2018-12-10 PROCEDURE — 71046 X-RAY EXAM CHEST 2 VIEWS: CPT

## 2018-12-10 PROCEDURE — 71046 X-RAY EXAM CHEST 2 VIEWS: CPT | Mod: 26

## 2019-01-14 ENCOUNTER — APPOINTMENT (OUTPATIENT)
Dept: INTERNAL MEDICINE | Facility: CLINIC | Age: 74
End: 2019-01-14
Payer: COMMERCIAL

## 2019-01-14 VITALS
WEIGHT: 175 LBS | BODY MASS INDEX: 25.05 KG/M2 | OXYGEN SATURATION: 98 % | SYSTOLIC BLOOD PRESSURE: 150 MMHG | HEIGHT: 70 IN | DIASTOLIC BLOOD PRESSURE: 84 MMHG | HEART RATE: 76 BPM | TEMPERATURE: 97.3 F | RESPIRATION RATE: 16 BRPM

## 2019-01-14 DIAGNOSIS — Z98.890 OTHER SPECIFIED POSTPROCEDURAL STATES: ICD-10-CM

## 2019-01-14 PROCEDURE — 99215 OFFICE O/P EST HI 40 MIN: CPT

## 2019-01-14 RX ORDER — CYCLOBENZAPRINE HYDROCHLORIDE 10 MG/1
10 TABLET, FILM COATED ORAL 3 TIMES DAILY
Refills: 0 | Status: DISCONTINUED | COMMUNITY
Start: 2019-01-14 | End: 2019-01-14

## 2019-01-14 NOTE — REVIEW OF SYSTEMS
[Nocturia] : nocturia [Muscle Pain] : muscle pain [Fever] : no fever [Chills] : no chills [Fatigue] : no fatigue [Hot Flashes] : no hot flashes [Night Sweats] : no night sweats [Recent Change In Weight] : ~T no recent weight change [Discharge] : no discharge [Pain] : no pain [Redness] : no redness [Dryness] : no dryness [Vision Problems] : no vision problems [Itching] : no itching [Earache] : no earache [Hearing Loss] : no hearing loss [Nosebleeds] : no nosebleeds [Postnasal Drip] : no postnasal drip [Nasal Discharge] : no nasal discharge [Sore Throat] : no sore throat [Hoarseness] : no hoarseness [Chest Pain] : no chest pain [Palpitations] : no palpitations [Claudication] : no  leg claudication [Lower Ext Edema] : no lower extremity edema [Orthopena] : no orthopnea [Paroysmal Nocturnal Dyspnea] : no paroysmal nocturnal dyspnea [Shortness Of Breath] : no shortness of breath [Wheezing] : no wheezing [Cough] : no cough [Dyspnea on Exertion] : not dyspnea on exertion [Abdominal Pain] : no abdominal pain [Nausea] : no nausea [Constipation] : no constipation [Diarrhea] : no diarrhea [Vomiting] : no vomiting [Heartburn] : no heartburn [Melena] : no melena [Dysuria] : no dysuria [Incontinence] : no incontinence [Hesitancy] : no hesitancy [Hematuria] : no hematuria [Frequency] : no frequency [Impotence] : no impotency [Poor Libido] : libido not poor [Joint Pain] : no joint pain [Joint Stiffness] : no joint stiffness [Muscle Weakness] : no muscle weakness [Back Pain] : no back pain [Joint Swelling] : no joint swelling [Itching] : no itching [Mole Changes] : no mole changes [Nail Changes] : no nail changes [Hair Changes] : no hair changes [Skin Rash] : no skin rash [Headache] : no headache [Dizziness] : no dizziness [Fainting] : no fainting [Confusion] : no confusion [Unsteady Walk] : no ataxia [Memory Loss] : no memory loss [Suicidal] : not suicidal [Insomnia] : no insomnia [Anxiety] : no anxiety [Depression] : no depression [Easy Bleeding] : no easy bleeding [Easy Bruising] : no easy bruising [Swollen Glands] : no swollen glands [FreeTextEntry9] : right ribs clavicle and scapular pain

## 2019-01-14 NOTE — HISTORY OF PRESENT ILLNESS
[Spouse] : spouse [FreeTextEntry1] : Comes to the office for followup to review his medications and discuss his overall health recent fall off a horse with multiple injuries [de-identified] : 73-year-old man with a previous history of GERD insomnia hyperlipidemia and spinal surgery comes to the office after his second fall from a loss this time fracturing 4 ribs on the right clavicle and scapula is [facility] [place] [facility] [place] [facility] he was and comes up for followup. No surgical recommendations were given he has been in 8-10 out of 10 pain relieved only by oxycodone he denies temperature chills sweats or myalgias he has had no sore throat cough wheezing shortness of breath pleurisy exertional dyspnea lightheadedness palpitations dizziness  or syncope. He has had several episodes of vertigo on this movement of his head he denies abdominal pain nausea vomiting diarrhea constipation bright red blood per rectum or black stools the pain is most severe in his ribs and scapula

## 2019-01-14 NOTE — HEALTH RISK ASSESSMENT
[Any fall with injury in past year] : Patient reported fall with injury in the past year [0] : 2) Feeling down, depressed, or hopeless: Not at all (0) [[id number]] : 0

## 2019-01-14 NOTE — ASSESSMENT
[FreeTextEntry1] : Physical exam shows a well developed man in severe pain she provided 97.3°F orally blood pressure 150/84 pulse of 76 respirations 16 HEENT was unremarkable chest was clear cardiovascular was regular abdomen was soft neuro nonfocal did not attempt to move the right shoulder secondary to severe pain initial right now is pain management we'll continue with Percocet on a regular basis will at times tried to use Tylenol Extra Strength for the minor episodes he certainly needs the medicine for sleep he called an orthopedic surgeon Dr. Gonzalez see him tomorrow at 1:30 PM medications were renewed encouraged to use his incentive spirometer keep the lungs expanded also continue to take stool softeners and Senokot as he is now aware that the narcotics can be quite constipating

## 2019-01-15 ENCOUNTER — APPOINTMENT (OUTPATIENT)
Dept: ORTHOPEDIC SURGERY | Facility: CLINIC | Age: 74
End: 2019-01-15
Payer: COMMERCIAL

## 2019-01-15 VITALS — WEIGHT: 175 LBS | BODY MASS INDEX: 25.05 KG/M2 | HEIGHT: 70 IN

## 2019-01-15 PROCEDURE — 99214 OFFICE O/P EST MOD 30 MIN: CPT

## 2019-01-15 PROCEDURE — 73000 X-RAY EXAM OF COLLAR BONE: CPT | Mod: RT

## 2019-01-15 PROCEDURE — 73030 X-RAY EXAM OF SHOULDER: CPT | Mod: RT

## 2019-01-15 NOTE — PHYSICAL EXAM
[Cane] : ambulates with cane [Normal RUE] : Right Upper Extremity: No scars, rashes, lesions, ulcers, skin intact [Normal LUE] : Left Upper Extremity: No scars, rashes, lesions, ulcers, skin intact [Normal Touch] : sensation intact for touch [Normal] : No swelling, no edema, normal pedal pulses and normal temperature [Poor Appearance] : well-appearing [Acute Distress] : not in acute distress [Obese] : not obese [de-identified] : Right Upper Extremity\par o Shoulder :\par ¦ Inspection/Palpation : tenderness over the medial clavicle and over the scapula, no swelling, bony prominence at level of sternoclavicular joint\par ¦ Range of Motion : limited, sling in place.\par ¦ Strength : unable to assess at this time.\par o Upper Arm : no tenderness, no swelling, no deformities\par o Muscle Bulk : no atrophy \par o Sensation : sensation intact to light touch \par o Skin : no skin rash or discoloration \par o Vascular Exam : no edema, no cyanosis, radial and ulnar pulses normal \par  \par Left Upper Extremity\par o Shoulder : \par ¦ Inspection/Palpation : no tenderness, no swelling, no deformities\par ¦ Strength : external rotation 5/5, internal rotation 5/5, supraspinatus 5/5 \par ¦ Stability : no joint instability on provocative testing\par o Upper Arm : no tenderness, no swelling, no deformities\par o Muscle Bulk : no atrophy\par o Sensation : sensation intact to light touch\par o Skin : no skin rash or discoloration\par o Vascular Exam : no edema, no cyanosis, radial and ulnar pulses normal  [de-identified] : o  Right Shoulder : Internal/External rotation, and outlet views were obtained, there are no soft tissue abnormalities, advanced osteoarthritis of the glenohumeral joint, non-displaced fracture of the scapular body, superior glenoid and coracoid process. \par \par o Right Clavicle: AP views were obtained, mildly displaced fracture of the medial clavicle just lateral to the SC joint

## 2019-01-15 NOTE — CONSULT LETTER
[Dear  ___] : Dear  [unfilled], [Consult Letter:] : I had the pleasure of evaluating your patient, [unfilled]. [Please see my note below.] : Please see my note below. [Consult Closing:] : Thank you very much for allowing me to participate in the care of this patient.  If you have any questions, please do not hesitate to contact me. [Sincerely,] : Sincerely, [FreeTextEntry3] : Lane Gonzalez

## 2019-01-15 NOTE — ADDENDUM
[FreeTextEntry1] : I, Ana Johnson, acted solely as a scribe for Dr. Dhruv Gonzalez on this date 01/15/2019 .

## 2019-01-15 NOTE — END OF VISIT
[FreeTextEntry3] : All medical record entries made by the Ranjitibe were at my, Dr. Dhruv Gonzalez, direction and personally dictated by me on 01/15/2019. I have reviewed the chart and agree that the record accurately reflects my personal performance of the history, physical exam, assessment and plan. I have also personally directed, reviewed, and agreed with the chart.

## 2019-01-15 NOTE — DISCUSSION/SUMMARY
[de-identified] : The underlying pathophysiology was reviewed in great detail with the patient as well as the various treatment options, including ice, analgesics, NSAIDs, Physical therapy, steroid injections and surgery.\par \par He is to continue immobilization in the sling.\par \par FU 4 weeks for repeat XRays.

## 2019-01-15 NOTE — HISTORY OF PRESENT ILLNESS
[de-identified] : 73 year old male presents accompanied by his wife, for an evaluation of right shoulder pain. On 1/4/2019 while he was in [place], he was thrown from a horse and sustained a direct impact to his right shoulder. At the time of injury he noted immediate pain and went to the ED. He had XRays taken and was told he had fractured his clavicle, scapula, and ribs. He was placed in a sling which he is wearing today and was given a prescription for Oxycodone-Acetaminophen and reports it helps to alleviate his symptoms. Today he rates his pain a 4/10 and describes it as a constant throbbing pain that is alleviated with immobilization of his shoulder. Pt notes that he was planning on a right TSA in June 2019.

## 2019-01-22 ENCOUNTER — APPOINTMENT (OUTPATIENT)
Dept: INTERNAL MEDICINE | Facility: CLINIC | Age: 74
End: 2019-01-22
Payer: COMMERCIAL

## 2019-01-22 VITALS — TEMPERATURE: 98.2 F

## 2019-01-22 VITALS
HEART RATE: 78 BPM | WEIGHT: 177 LBS | BODY MASS INDEX: 25.34 KG/M2 | RESPIRATION RATE: 15 BRPM | SYSTOLIC BLOOD PRESSURE: 138 MMHG | DIASTOLIC BLOOD PRESSURE: 80 MMHG | HEIGHT: 70 IN
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
## 2019-01-22 DIAGNOSIS — R42 DIZZINESS AND GIDDINESS: ICD-10-CM

## 2019-01-22 PROCEDURE — 99215 OFFICE O/P EST HI 40 MIN: CPT

## 2019-01-22 NOTE — HEALTH RISK ASSESSMENT
[Two or more falls in past year] : Patient reported two or more falls in the past year [0] : 2) Feeling down, depressed, or hopeless: Not at all (0) [[id number]] : 0

## 2019-01-22 NOTE — ASSESSMENT
[FreeTextEntry1] : Physical exam shows a well-developed man in no acute distress he is afebrile at 98.2°F orally blood pressure 130/80 pulse is 78 respirations 15 HEENT were unremarkable chest clear cardiovascular regular abdomen soft and neurologic exam was nonfocal. Zolpidem CR 12 mg were ordered for his insomnia which has helped him in the past took about pain management he is still reluctant to get off the oxycodone although he has reduced it from 10 mg to 5 mg he is using a combination of Dulcolax and senna to keep his bowels moving vertigo can be associated to both anti-inflammatory pills and codeine derivatives because it came on after trauma he will see his in her nose and throat specialist meclizine has had no improvement in symptoms which are controlled by limiting rapid movements of the head. He will wait to brandy-ray his fractures when he sees his orthopedist in 3 weeks

## 2019-01-22 NOTE — REVIEW OF SYSTEMS
[Joint Stiffness] : joint stiffness [Insomnia] : insomnia [Anxiety] : anxiety [Depression] : depression [Fever] : no fever [Chills] : no chills [Fatigue] : no fatigue [Hot Flashes] : no hot flashes [Night Sweats] : no night sweats [Recent Change In Weight] : ~T no recent weight change [Discharge] : no discharge [Pain] : no pain [Redness] : no redness [Dryness] : no dryness [Vision Problems] : no vision problems [Earache] : no earache [Hearing Loss] : no hearing loss [Nosebleeds] : no nosebleeds [Postnasal Drip] : no postnasal drip [Nasal Discharge] : no nasal discharge [Sore Throat] : no sore throat [Hoarseness] : no hoarseness [Chest Pain] : no chest pain [Palpitations] : no palpitations [Claudication] : no  leg claudication [Lower Ext Edema] : no lower extremity edema [Orthopena] : no orthopnea [Paroysmal Nocturnal Dyspnea] : no paroysmal nocturnal dyspnea [Shortness Of Breath] : no shortness of breath [Wheezing] : no wheezing [Cough] : no cough [Dyspnea on Exertion] : not dyspnea on exertion [Abdominal Pain] : no abdominal pain [Nausea] : no nausea [Constipation] : no constipation [Diarrhea] : no diarrhea [Vomiting] : no vomiting [Heartburn] : no heartburn [Melena] : no melena [Dysuria] : no dysuria [Incontinence] : no incontinence [Hesitancy] : no hesitancy [Nocturia] : no nocturia [Hematuria] : no hematuria [Frequency] : no frequency [Impotence] : no impotency [Poor Libido] : libido not poor [Joint Pain] : no joint pain [Muscle Pain] : no muscle pain [Muscle Weakness] : no muscle weakness [Back Pain] : no back pain [Joint Swelling] : no joint swelling [Headache] : no headache [Dizziness] : no dizziness [Fainting] : no fainting [Confusion] : no confusion [Unsteady Walk] : no ataxia [Memory Loss] : no memory loss [Suicidal] : not suicidal [Easy Bleeding] : no easy bleeding [Easy Bruising] : no easy bruising [Swollen Glands] : no swollen glands [FreeTextEntry9] : broken scapular and collarbone and ribs right

## 2019-01-22 NOTE — HISTORY OF PRESENT ILLNESS
[FreeTextEntry1] : Comes to the office for followup to review his medications and discuss his overall health. He wishes or vertigo insomnia and increasing pain in his right clavicle [de-identified] : 73-year-old man comes to the office for followup having fallen off a house twice this year the less incidence breaking his clavicle scapula and this for right-sided ribs patient has noticed vertigo over the past week episodes of fleeting and not associated with vomiting they are always occurring with movement of his head. He is also having tremendous difficult time sleeping he claims that not all of it is secondary to pain. He has been encouraging his bowels with duplex tablets and senna she denies temperature chills sweats or myalgias he's had no headache sinus congestion ringing in the ears sore throat cough wheezing chest pain pleurisy shortness of breath exertional dyspnea lightheadedness palpitations dizziness or syncope. He has recently reduced his oxycodone from 10 mg to 5 mg and has noticed increased severity of the pain  in his clavicle

## 2019-02-14 ENCOUNTER — APPOINTMENT (OUTPATIENT)
Dept: ORTHOPEDIC SURGERY | Facility: CLINIC | Age: 74
End: 2019-02-14
Payer: COMMERCIAL

## 2019-02-14 PROCEDURE — 99214 OFFICE O/P EST MOD 30 MIN: CPT | Mod: 25

## 2019-02-14 PROCEDURE — 73000 X-RAY EXAM OF COLLAR BONE: CPT | Mod: RT

## 2019-02-14 PROCEDURE — 73030 X-RAY EXAM OF SHOULDER: CPT | Mod: RT

## 2019-02-14 PROCEDURE — 20610 DRAIN/INJ JOINT/BURSA W/O US: CPT | Mod: LT

## 2019-02-14 NOTE — DISCUSSION/SUMMARY
[de-identified] : The underlying pathophysiology was reviewed in great detail with the patient as well as the various treatment options, including ice, analgesics, NSAIDs, Physical therapy, steroid injections, reverse right TSA.\par \par The patient wishes to proceed with an INJECTION of the left shoulder.\par \par He can discontinue use of the sling. \par \par A home exercise sheet was given and discussed with the patient to follow. \par \par A prescription for Tramadol was provided.\par \par The patient wishes to proceed with SURGICAL INTERVENTION at this time. The risks and benefits of a RIGHT TOTAL SHOULDER ARTHROPLASTY (VS REVERSE TOTAL SHOULDER ARTHROPLASTY) were discussed in great detail today,  including but not limited to bleeding, infection, nerve injury, DVT, allergy to the anesthetic or to the implants, persistent pain, stiffness, scarring, swelling or deformity. He and his wife are going to decide when works best for them. \par \par I recommend a CT Scan of the right shoulder prior to surgery to evaluate the glenoid version prior to planning TSA or reverse TSA. \par

## 2019-02-14 NOTE — END OF VISIT
[FreeTextEntry3] : All medical record entries made by the Ranjitibe were at my, Dr. Dhruv Gonzalez, direction and personally dictated by me on 02/14/2019. I have reviewed the chart and agree that the record accurately reflects my personal performance of the history, physical exam, assessment and plan. I have also personally directed, reviewed, and agreed with the chart.

## 2019-02-14 NOTE — ADDENDUM
[FreeTextEntry1] : I, Ana Johnson, acted solely as a scribe for Dr. Dhruv Gonzalez on this date 02/14/2019.

## 2019-02-14 NOTE — HISTORY OF PRESENT ILLNESS
[de-identified] : 73 year old male presents accompanied by his wife, for an evaluation of right shoulder pain. On 1/4/2019 while he was in [place], he was thrown from a horse and sustained a direct impact to his right shoulder. He had XRays taken at the ED and was told he had fractured his clavicle, scapula, and ribs. He says that he has still been experiencing a throbbing pain about his shoulder since his last visit. Today he is wearing a sling and says he has been taking Oxycodone-Acetaminophen to alleviate his symptoms.  He also reports he has began to experience left shoulder pain due to overcompensation for his right shoulder. He has had difficulty sleeping at night because he is unable to sleep on wither side due to bilateral shoulder pain. Of note, he was planning on a right TSA in June 2019.

## 2019-02-14 NOTE — PROCEDURE
[de-identified] : At this point I recommended a therapeutic injection and under sterile precautions an injection of 4 cc 1% lidocaine with 0.5 cc of Kenalog and 0.5 cc of Dexamethasone- was placed into the glenohumeral joint of the Left shoulder without complication, and after several minutes, the patient felt significant relief.

## 2019-02-14 NOTE — PHYSICAL EXAM
[Normal RUE] : Right Upper Extremity: No scars, rashes, lesions, ulcers, skin intact [Normal LUE] : Left Upper Extremity: No scars, rashes, lesions, ulcers, skin intact [Normal Touch] : sensation intact for touch [Normal] : Gait: normal [Poor Appearance] : well-appearing [Acute Distress] : not in acute distress [Obese] : not obese [de-identified] : Right Upper Extremity\par o Shoulder :\par ¦ Inspection/Palpation : tenderness over the medial clavicle and over the scapula, no swelling, bony prominence at level of sternoclavicular joint\par ¦ Range of Motion : ACTIVE FORWARD ELEVATION: Measured at 55 degrees, ACTIVE EXTERNAL ROTATION: Measured at 20 degrees, ACTIVE INTERNAL ROTATION: Measured at greater trochanter \par ¦ Strength : external rotation 4+/5, internal rotation 5/5, supraspinatus 4+/5 with pain \par ¦ Stability : no joint instability on provocative testing\par o Upper Arm : no tenderness, no swelling, no deformities\par o Muscle Bulk : no atrophy \par o Sensation : sensation intact to light touch \par o Skin : no skin rash or discoloration \par o Vascular Exam : no edema, no cyanosis, radial and ulnar pulses normal \par  \par Left Upper Extremity\par o Shoulder : \par ¦ Inspection/Palpation : no tenderness, no swelling, no deformities\par ¦ Range of Motion : ACTIVE FORWARD ELEVATION: Measured at 100 degrees, ACTIVE EXTERNAL ROTATION: Measured at 25 degrees, ACTIVE INTERNAL ROTATION: Measured at L5\par ¦ Strength : external rotation 5/5, internal rotation 5/5, supraspinatus 5/5 \par ¦ Stability : no joint instability on provocative testing\par o Upper Arm : no tenderness, no swelling, no deformities\par o Muscle Bulk : no atrophy\par o Sensation : sensation intact to light touch\par o Skin : no skin rash or discoloration\par o Vascular Exam : no edema, no cyanosis, radial and ulnar pulses normal  [de-identified] : o  Right Shoulder : Internal/External rotation, and outlet views were obtained, there are no soft tissue abnormalities, advanced osteoarthritis of the glenohumeral joint, non-displaced fracture of the scapular body with callus formation, superior glenoid and coracoid process. \par \par o Right Clavicle: AP views were obtained, mildly displaced fracture of the medial clavicle just lateral to the SC joint with evidence of healing, severe DJD of left shoulder

## 2019-02-25 ENCOUNTER — RX RENEWAL (OUTPATIENT)
Age: 74
End: 2019-02-25

## 2019-02-26 ENCOUNTER — APPOINTMENT (OUTPATIENT)
Dept: ORTHOPEDIC SURGERY | Facility: CLINIC | Age: 74
End: 2019-02-26
Payer: COMMERCIAL

## 2019-02-26 VITALS — BODY MASS INDEX: 25.34 KG/M2 | WEIGHT: 177 LBS | HEIGHT: 70 IN

## 2019-02-26 DIAGNOSIS — S42.011D: ICD-10-CM

## 2019-02-26 DIAGNOSIS — S42.144D: ICD-10-CM

## 2019-02-26 DIAGNOSIS — M19.019 PRIMARY OSTEOARTHRITIS, UNSPECIFIED SHOULDER: ICD-10-CM

## 2019-02-26 PROCEDURE — 73000 X-RAY EXAM OF COLLAR BONE: CPT | Mod: RT

## 2019-02-26 PROCEDURE — 99214 OFFICE O/P EST MOD 30 MIN: CPT

## 2019-02-26 PROCEDURE — 73030 X-RAY EXAM OF SHOULDER: CPT | Mod: RT

## 2019-02-27 PROBLEM — S42.144D: Status: ACTIVE | Noted: 2019-01-14

## 2019-02-27 PROBLEM — S42.011D: Status: ACTIVE | Noted: 2019-01-14

## 2019-02-27 PROBLEM — M19.019 SHOULDER ARTHRITIS: Noted: 2017-02-03

## 2019-02-27 NOTE — HISTORY OF PRESENT ILLNESS
[de-identified] : 73 year old male presents accompanied by his wife, for an evaluation of right shoulder pain. On 1/4/2019 while he was in [place], he was thrown from a horse and sustained a direct impact to his right shoulder. He had XRays taken at the ED and was told he had fractured his clavicle, scapula, and ribs. He says that he has still been experiencing a throbbing pain about his shoulder and that his pain has been worsening since his last visit. Today he is wearing a sling and says he has been taking Oxycodone-Acetaminophen, Tramadol, and Advil  to alleviate his symptoms. He has had difficulty sleeping at night because he is unable to sleep on wither side due to bilateral shoulder pain. He presents today because he has some questions about the right reverse TSA scheduled for 6/19/2019.

## 2019-02-27 NOTE — PHYSICAL EXAM
[Normal RUE] : Right Upper Extremity: No scars, rashes, lesions, ulcers, skin intact [Normal LUE] : Left Upper Extremity: No scars, rashes, lesions, ulcers, skin intact [Normal Touch] : sensation intact for touch [Normal] : No swelling, no edema, normal pedal pulses and normal temperature [Poor Appearance] : well-appearing [Acute Distress] : not in acute distress [Obese] : not obese [de-identified] : Right Upper Extremity\par o Shoulder :\par ¦ Inspection/Palpation : tenderness over the medial clavicle and over the scapula, no swelling, bony prominence at level of sternoclavicular joint\par ¦ Range of Motion :  PASSIVE FORWARD ELEVATION: Measured at 60 degrees with pain,  ACTIVE FORWARD ELEVATION: Measured at 20 degrees, ACTIVE EXTERNAL ROTATION: Measured at 15 degrees, ACTIVE INTERNAL ROTATION: Measured at PSIS \par ¦ Strength : external rotation 4/5, internal rotation 5/5\par ¦ Stability : no joint instability on provocative testing\par o Upper Arm : no tenderness, no swelling, no deformities\par o Muscle Bulk : no atrophy \par o Sensation : sensation intact to light touch \par o Skin : no skin rash or discoloration \par o Vascular Exam : no edema, no cyanosis, radial and ulnar pulses normal  [de-identified] : o  Right Shoulder : Internal/External rotation, and outlet views were obtained, there are no soft tissue abnormalities, advanced osteoarthritis of the glenohumeral joint, non-displaced fracture of the scapular body with callus formation, superior glenoid and coracoid process. \par \par o Right Clavicle: AP views were obtained, mildly displaced fracture of the medial clavicle just lateral to the SC joint with evidence of healing, severe DJD of left shoulder.

## 2019-02-27 NOTE — END OF VISIT
[FreeTextEntry3] : All medical record entries made by the Ranjitibe were at my, Dr. Dhruv Gonzalez, direction and personally dictated by me on 02/26/2019. I have reviewed the chart and agree that the record accurately reflects my personal performance of the history, physical exam, assessment and plan. I have also personally directed, reviewed, and agreed with the chart.

## 2019-02-27 NOTE — DISCUSSION/SUMMARY
[de-identified] : The underlying pathophysiology was reviewed in great detail with the patient as well as the various treatment options, including ice, analgesics, NSAIDs, Physical therapy, steroid injections, reverse right TSA.\par \par Prescriptions for Celebrex and Tramadol were provided.\par \par The patient wishes to proceed with SURGICAL INTERVENTION at this time. The risks and benefits of a RIGHT TOTAL SHOULDER ARTHROPLASTY (VS REVERSE TOTAL SHOULDER ARTHROPLASTY) were discussed in great detail today,  including but not limited to bleeding, infection, nerve injury, DVT, allergy to the anesthetic or to the implants, persistent pain, stiffness, scarring, swelling or deformity. He is scheduled fro surgery on 6/19/2019.\par \par I recommend a CT Scan of the right shoulder prior to surgery to evaluate the glenoid version prior to planning TSA or reverse TSA. A prescription was provided.

## 2019-02-27 NOTE — ADDENDUM
[FreeTextEntry1] : I, Ana Johnson, acted solely as a scribe for Dr. Dhruv Gonzalez on this date 02/26/2019.

## 2019-03-26 ENCOUNTER — TRANSCRIPTION ENCOUNTER (OUTPATIENT)
Age: 74
End: 2019-03-26

## 2019-04-08 ENCOUNTER — MEDICATION RENEWAL (OUTPATIENT)
Age: 74
End: 2019-04-08

## 2019-05-13 ENCOUNTER — RX RENEWAL (OUTPATIENT)
Age: 74
End: 2019-05-13

## 2019-05-20 ENCOUNTER — RX RENEWAL (OUTPATIENT)
Age: 74
End: 2019-05-20

## 2019-06-18 ENCOUNTER — APPOINTMENT (OUTPATIENT)
Dept: INTERNAL MEDICINE | Facility: CLINIC | Age: 74
End: 2019-06-18
Payer: COMMERCIAL

## 2019-06-18 VITALS
TEMPERATURE: 97.5 F | HEART RATE: 65 BPM | WEIGHT: 185 LBS | SYSTOLIC BLOOD PRESSURE: 140 MMHG | HEIGHT: 70 IN | BODY MASS INDEX: 26.48 KG/M2 | RESPIRATION RATE: 16 BRPM | DIASTOLIC BLOOD PRESSURE: 80 MMHG | OXYGEN SATURATION: 99 %

## 2019-06-18 DIAGNOSIS — M25.512 PAIN IN RIGHT SHOULDER: ICD-10-CM

## 2019-06-18 DIAGNOSIS — M25.511 PAIN IN RIGHT SHOULDER: ICD-10-CM

## 2019-06-18 DIAGNOSIS — J30.9 ALLERGIC RHINITIS, UNSPECIFIED: ICD-10-CM

## 2019-06-18 DIAGNOSIS — G89.29 PAIN IN RIGHT SHOULDER: ICD-10-CM

## 2019-06-18 PROCEDURE — 99215 OFFICE O/P EST HI 40 MIN: CPT

## 2019-06-18 RX ORDER — FLUTICASONE PROPIONATE 50 UG/1
50 SPRAY, METERED NASAL DAILY
Qty: 1 | Refills: 2 | Status: ACTIVE | COMMUNITY
Start: 2019-06-18 | End: 1900-01-01

## 2019-06-18 RX ORDER — CELECOXIB 200 MG/1
200 CAPSULE ORAL
Qty: 60 | Refills: 2 | Status: DISCONTINUED | COMMUNITY
Start: 2019-02-26 | End: 2019-06-18

## 2019-06-18 RX ORDER — OXYCODONE AND ACETAMINOPHEN 5; 325 MG/1; MG/1
5-325 TABLET ORAL
Qty: 180 | Refills: 0 | Status: DISCONTINUED | COMMUNITY
Start: 2018-12-03 | End: 2019-06-18

## 2019-06-18 RX ORDER — TRAMADOL HYDROCHLORIDE 50 MG/1
50 TABLET, COATED ORAL EVERY 6 HOURS
Qty: 240 | Refills: 0 | Status: DISCONTINUED | COMMUNITY
Start: 2019-02-26 | End: 2019-06-18

## 2019-06-18 RX ORDER — TRAMADOL HYDROCHLORIDE 50 MG/1
50 TABLET, COATED ORAL
Qty: 60 | Refills: 0 | Status: DISCONTINUED | COMMUNITY
Start: 2019-02-14 | End: 2019-06-18

## 2019-06-18 NOTE — HISTORY OF PRESENT ILLNESS
[Mild] : mild [___ Days ago] : [unfilled] days ago [Congestion] : congestion [Constant] : constant [Fatigue] : not fatigue [Cough] : no cough [Chills] : no chills [Improving] : improving [de-identified] : sinus [FreeTextEntry8] : 73-year-old man comes to the office scheduled to have surgery at [facility] for special surgery this Friday on the shoulder for followup his medical clearance will be performed at the [facility] for special surgery comes to the office complaining of postnasal drip and a mucousy feeling in the back of his throat he has had no temperature chills sweats or myalgias none of his mucus is thick or colorful there is no deep chest cough wheezing pleurisy chest pain shortness of breath exertional dyspnea lightheadedness palpitations dizziness vertigo or syncope denies sore throat headaches, pain nausea vomiting diarrhea constipation bright red blood per rectum or black stools he is having surgery on the right shoulder and will be doing the left shoulder at some time afterwards he denies dysuria gross hematuria he gets up occasionally at night to urinate he said no claudication no skin rashes. He has had trouble sleeping lately

## 2019-06-18 NOTE — REVIEW OF SYSTEMS
[Joint Stiffness] : joint stiffness [Joint Pain] : joint pain [Insomnia] : insomnia [Anxiety] : anxiety [Depression] : depression [Fever] : no fever [Chills] : no chills [Fatigue] : no fatigue [Hot Flashes] : no hot flashes [Night Sweats] : no night sweats [Recent Change In Weight] : ~T no recent weight change [Discharge] : no discharge [Pain] : no pain [Redness] : no redness [Vision Problems] : no vision problems [Dryness] : no dryness [Hearing Loss] : no hearing loss [Earache] : no earache [Nosebleeds] : no nosebleeds [Postnasal Drip] : no postnasal drip [Nasal Discharge] : no nasal discharge [Hoarseness] : no hoarseness [Sore Throat] : no sore throat [Chest Pain] : no chest pain [Palpitations] : no palpitations [Claudication] : no  leg claudication [Orthopena] : no orthopnea [Paroysmal Nocturnal Dyspnea] : no paroysmal nocturnal dyspnea [Lower Ext Edema] : no lower extremity edema [Wheezing] : no wheezing [Shortness Of Breath] : no shortness of breath [Cough] : no cough [Dyspnea on Exertion] : not dyspnea on exertion [Abdominal Pain] : no abdominal pain [Nausea] : no nausea [Constipation] : no constipation [Diarrhea] : no diarrhea [Vomiting] : no vomiting [Melena] : no melena [Heartburn] : no heartburn [Incontinence] : no incontinence [Dysuria] : no dysuria [Hesitancy] : no hesitancy [Nocturia] : no nocturia [Hematuria] : no hematuria [Frequency] : no frequency [Impotence] : no impotency [Muscle Pain] : no muscle pain [Poor Libido] : libido not poor [Back Pain] : no back pain [Joint Swelling] : no joint swelling [Muscle Weakness] : no muscle weakness [Skin Rash] : no skin rash [Itching] : no itching [Dizziness] : no dizziness [Headache] : no headache [Fainting] : no fainting [Confusion] : no confusion [Unsteady Walk] : no ataxia [Suicidal] : not suicidal [Memory Loss] : no memory loss [Easy Bleeding] : no easy bleeding [Easy Bruising] : no easy bruising [FreeTextEntry9] : bilateral shoulder [Swollen Glands] : no swollen glands

## 2019-06-18 NOTE — PHYSICAL EXAM
[No Acute Distress] : no acute distress [Well Nourished] : well nourished [Well Developed] : well developed [Well-Appearing] : well-appearing [Normal Voice/Communication] : normal voice/communication [Normal Sclera/Conjunctiva] : normal sclera/conjunctiva [EOMI] : extraocular movements intact [PERRL] : pupils equal round and reactive to light [Normal Oropharynx] : the oropharynx was normal [Normal Outer Ear/Nose] : the outer ears and nose were normal in appearance [Normal TMs] : both tympanic membranes were normal [Supple] : supple [No JVD] : no jugular venous distention [Thyroid Normal, No Nodules] : the thyroid was normal and there were no nodules present [No Lymphadenopathy] : no lymphadenopathy [No Respiratory Distress] : no respiratory distress  [Clear to Auscultation] : lungs were clear to auscultation bilaterally [Normal Percussion] : the chest was normal to percussion [Normal Rate] : normal rate  [No Accessory Muscle Use] : no accessory muscle use [Regular Rhythm] : with a regular rhythm [Normal S1, S2] : normal S1 and S2 [No Carotid Bruits] : no carotid bruits [No Murmur] : no murmur heard [No Abdominal Bruit] : a ~M bruit was not heard ~T in the abdomen [No Varicosities] : no varicosities [Pedal Pulses Present] : the pedal pulses are present [No Extremity Clubbing/Cyanosis] : no extremity clubbing/cyanosis [No Edema] : there was no peripheral edema [Declined Breast Exam] : declined breast exam  [No Palpable Aorta] : no palpable aorta [Non Tender] : non-tender [Soft] : abdomen soft [Non-distended] : non-distended [No HSM] : no HSM [No Masses] : no abdominal mass palpated [Normal Bowel Sounds] : normal bowel sounds [No Hernias] : no hernias [Normal Supraclavicular Nodes] : no supraclavicular lymphadenopathy [Declined Rectal Exam] : declined rectal exam [Normal Posterior Cervical Nodes] : no posterior cervical lymphadenopathy [Normal Femoral Nodes] : no femoral lymphadenopathy [Normal Axillary Nodes] : no axillary lymphadenopathy [Normal Anterior Cervical Nodes] : no anterior cervical lymphadenopathy [Normal Inguinal Nodes] : no inguinal lymphadenopathy [No CVA Tenderness] : no CVA  tenderness [No Spinal Tenderness] : no spinal tenderness [Scoliosis] : no scoliosis [Kyphosis] : no kyphosis [No Joint Swelling] : no joint swelling [Grossly Normal Strength/Tone] : grossly normal strength/tone [Normal Gait] : normal gait [No Rash] : no rash [Coordination Grossly Intact] : coordination grossly intact [No Focal Deficits] : no focal deficits [Deep Tendon Reflexes (DTR)] : deep tendon reflexes were 2+ and symmetric [Memory Grossly Normal] : memory grossly normal [Speech Grossly Normal] : speech grossly normal [Alert and Oriented x3] : oriented to person, place, and time [Normal Affect] : the affect was normal [Normal Insight/Judgement] : insight and judgment were intact [Normal Mood] : the mood was normal [de-identified] : watery

## 2019-06-18 NOTE — HEALTH RISK ASSESSMENT
[No falls in past year] : Patient reported no falls in the past year [0] : 1) Little interest or pleasure doing things: Not at all (0) [[id number]] : 0

## 2019-06-18 NOTE — ASSESSMENT
[FreeTextEntry1] : Physical examination shows a well-developed man in no acute distress temperature was 97.5°F orally blood pressure 140/80 heart rate 65 respiratory rate 16 HEENT shows watery mucus in his nostrils there was no pharyngitis or exudate there was no cervical adenopathy chest was clear with no rhonchi rales wheezing or dullness to percussion progressed and was regular abdomen soft neuro nonfocal no need for antibiotics will treat with Flonase Zyrtec patient complaining of extreme insomnia we'll give him a one-time supply of Ambien 5 mg. Patient's shoulders but nonresponsive to physical therapy and medical management replacement of both shoulders is planned

## 2019-06-19 ENCOUNTER — OTHER (OUTPATIENT)
Age: 74
End: 2019-06-19

## 2019-06-19 ENCOUNTER — APPOINTMENT (OUTPATIENT)
Dept: ORTHOPEDIC SURGERY | Facility: HOSPITAL | Age: 74
End: 2019-06-19

## 2019-06-25 ENCOUNTER — TRANSCRIPTION ENCOUNTER (OUTPATIENT)
Age: 74
End: 2019-06-25

## 2019-07-16 ENCOUNTER — APPOINTMENT (OUTPATIENT)
Dept: INTERNAL MEDICINE | Facility: CLINIC | Age: 74
End: 2019-07-16
Payer: COMMERCIAL

## 2019-07-16 VITALS
DIASTOLIC BLOOD PRESSURE: 74 MMHG | WEIGHT: 185 LBS | RESPIRATION RATE: 15 BRPM | BODY MASS INDEX: 26.48 KG/M2 | HEIGHT: 70 IN | SYSTOLIC BLOOD PRESSURE: 138 MMHG | HEART RATE: 70 BPM

## 2019-07-16 VITALS — TEMPERATURE: 97.7 F

## 2019-07-16 DIAGNOSIS — J40 BRONCHITIS, NOT SPECIFIED AS ACUTE OR CHRONIC: ICD-10-CM

## 2019-07-16 PROCEDURE — 99214 OFFICE O/P EST MOD 30 MIN: CPT

## 2019-07-16 RX ORDER — BUDESONIDE AND FORMOTEROL FUMARATE DIHYDRATE 80; 4.5 UG/1; UG/1
80-4.5 AEROSOL RESPIRATORY (INHALATION) TWICE DAILY
Qty: 1 | Refills: 3 | Status: ACTIVE | COMMUNITY
Start: 2019-07-16 | End: 1900-01-01

## 2019-07-16 NOTE — PHYSICAL EXAM
[No Acute Distress] : no acute distress [Well Nourished] : well nourished [Well Developed] : well developed [Well-Appearing] : well-appearing [Normal Voice/Communication] : normal voice/communication [Normal Sclera/Conjunctiva] : normal sclera/conjunctiva [PERRL] : pupils equal round and reactive to light [EOMI] : extraocular movements intact [Normal Outer Ear/Nose] : the outer ears and nose were normal in appearance [Normal Oropharynx] : the oropharynx was normal [Normal TMs] : both tympanic membranes were normal [No JVD] : no jugular venous distention [No Lymphadenopathy] : no lymphadenopathy [Supple] : supple [Thyroid Normal, No Nodules] : the thyroid was normal and there were no nodules present [No Respiratory Distress] : no respiratory distress  [No Accessory Muscle Use] : no accessory muscle use [Normal Rate] : normal rate  [Regular Rhythm] : with a regular rhythm [Normal S1, S2] : normal S1 and S2 [No Murmur] : no murmur heard [No Carotid Bruits] : no carotid bruits [No Abdominal Bruit] : a ~M bruit was not heard ~T in the abdomen [No Varicosities] : no varicosities [Pedal Pulses Present] : the pedal pulses are present [No Edema] : there was no peripheral edema [No Palpable Aorta] : no palpable aorta [No Extremity Clubbing/Cyanosis] : no extremity clubbing/cyanosis [Declined Breast Exam] : declined breast exam  [Soft] : abdomen soft [Non Tender] : non-tender [Non-distended] : non-distended [No Masses] : no abdominal mass palpated [No HSM] : no HSM [Normal Bowel Sounds] : normal bowel sounds [No Hernias] : no hernias [Declined Rectal Exam] : declined rectal exam [Normal Supraclavicular Nodes] : no supraclavicular lymphadenopathy [Normal Axillary Nodes] : no axillary lymphadenopathy [Normal Posterior Cervical Nodes] : no posterior cervical lymphadenopathy [Normal Anterior Cervical Nodes] : no anterior cervical lymphadenopathy [Normal Inguinal Nodes] : no inguinal lymphadenopathy [Normal Femoral Nodes] : no femoral lymphadenopathy [No CVA Tenderness] : no CVA  tenderness [No Spinal Tenderness] : no spinal tenderness [No Joint Swelling] : no joint swelling [Grossly Normal Strength/Tone] : grossly normal strength/tone [No Rash] : no rash [No Skin Lesions] : no skin lesions [Coordination Grossly Intact] : coordination grossly intact [No Focal Deficits] : no focal deficits [Normal Gait] : normal gait [Deep Tendon Reflexes (DTR)] : deep tendon reflexes were 2+ and symmetric [Speech Grossly Normal] : speech grossly normal [Memory Grossly Normal] : memory grossly normal [Normal Affect] : the affect was normal [Alert and Oriented x3] : oriented to person, place, and time [Normal Mood] : the mood was normal [Normal Insight/Judgement] : insight and judgment were intact [Kyphosis] : no kyphosis [Scoliosis] : no scoliosis [Acne] : no acne [de-identified] : watery [de-identified] : pasha

## 2019-07-16 NOTE — HISTORY OF PRESENT ILLNESS
[Moderate] : moderate [___ Days ago] : [unfilled] days ago [Gradual] : gradually [Paroxysmal] : paroxysmal [Congestion] : congestion [Cough] : cough [OTC Remedies] : OTC remedies [At Night] : at night [Stable] : stable [Sore Throat] : no sore throat [Wheezing] : no wheezing [Chills] : no chills [Anorexia] : no anorexia [Shortness Of Breath] : no shortness of breath [Earache] : no earache [Fatigue] : not fatigue [Headache] : no headache [Fever] : no fever [FreeTextEntry8] : 73-year-old man with recent shoulder surgery hyperlipidemia insomnia GERD and Cabrera's esophagus comes to the office acutely with a 7 -10 day history of progressive sinus congestion postnasal drip cough and wheezing he has had no temperature chills sweats or myalgias he denies headache he has had sinus congestion denies sore throat he has had intermittent wheezing he has had no pleurisy chest pain shortness of breath exertional dyspnea lightheadedness palpitations dizziness vertigo or syncope he's had no abdominal pain nausea vomiting diarrhea constipation bright red blood per rectum or black stools his appetite has been good weight is stable except for his shoulders which is rehabilitating he denies any other musculoskeletal pain at present he said no dysuria or gross hematuria

## 2019-07-16 NOTE — REVIEW OF SYSTEMS
[Postnasal Drip] : postnasal drip [Nasal Discharge] : nasal discharge [Cough] : cough [Joint Stiffness] : joint stiffness [Insomnia] : insomnia [Anxiety] : anxiety [Depression] : depression [Joint Pain] : joint pain [Fever] : no fever [Chills] : no chills [Fatigue] : no fatigue [Hot Flashes] : no hot flashes [Night Sweats] : no night sweats [Recent Change In Weight] : ~T no recent weight change [Discharge] : no discharge [Pain] : no pain [Redness] : no redness [Dryness] : no dryness [Vision Problems] : no vision problems [Itching] : no itching [Earache] : no earache [Hearing Loss] : no hearing loss [Nosebleeds] : no nosebleeds [Sore Throat] : no sore throat [Hoarseness] : no hoarseness [Chest Pain] : no chest pain [Palpitations] : no palpitations [Claudication] : no  leg claudication [Lower Ext Edema] : no lower extremity edema [Orthopena] : no orthopnea [Paroysmal Nocturnal Dyspnea] : no paroysmal nocturnal dyspnea [Shortness Of Breath] : no shortness of breath [Wheezing] : no wheezing [Dyspnea on Exertion] : not dyspnea on exertion [Abdominal Pain] : no abdominal pain [Nausea] : no nausea [Constipation] : no constipation [Diarrhea] : no diarrhea [Vomiting] : no vomiting [Heartburn] : no heartburn [Melena] : no melena [Dysuria] : no dysuria [Incontinence] : no incontinence [Hesitancy] : no hesitancy [Nocturia] : no nocturia [Hematuria] : no hematuria [Frequency] : no frequency [Impotence] : no impotency [Poor Libido] : libido not poor [Muscle Pain] : no muscle pain [Muscle Weakness] : no muscle weakness [Back Pain] : no back pain [Joint Swelling] : no joint swelling [Itching] : no itching [Mole Changes] : no mole changes [Nail Changes] : no nail changes [Hair Changes] : no hair changes [Skin Rash] : no skin rash [Headache] : no headache [Dizziness] : no dizziness [Fainting] : no fainting [Confusion] : no confusion [Unsteady Walk] : no ataxia [Memory Loss] : no memory loss [Suicidal] : not suicidal [Easy Bleeding] : no easy bleeding [Easy Bruising] : no easy bruising [Swollen Glands] : no swollen glands [FreeTextEntry9] : shoulder

## 2019-07-16 NOTE — ASSESSMENT
[FreeTextEntry1] : Physical examination shows a well-developed man in no acute distress his temperature is 97.7°F orally his blood pressure was 138/74 a pulse of 70 respirations 16 HEENT was no pharyngitis there was copious secretions in the pharynx no exudate chest showed scattered rhonchi without wheezing rales or dullness to percussion cardiovascular was regular abdomen soft neuro nonfocal impression upper respiratory infection treated with Nasacort antihistamine Mucinex DM Symbicort and Z-Braulio patient will report in daily or immediately if his symptoms worsen.Zolpidem CR 12.5 mg was given for difficulty sleeping

## 2019-08-07 ENCOUNTER — RX RENEWAL (OUTPATIENT)
Age: 74
End: 2019-08-07

## 2019-08-20 ENCOUNTER — RX RENEWAL (OUTPATIENT)
Age: 74
End: 2019-08-20

## 2019-08-27 ENCOUNTER — APPOINTMENT (OUTPATIENT)
Dept: INTERNAL MEDICINE | Facility: CLINIC | Age: 74
End: 2019-08-27

## 2019-10-03 ENCOUNTER — APPOINTMENT (OUTPATIENT)
Dept: INTERNAL MEDICINE | Facility: CLINIC | Age: 74
End: 2019-10-03

## 2019-10-08 ENCOUNTER — APPOINTMENT (OUTPATIENT)
Dept: INTERNAL MEDICINE | Facility: CLINIC | Age: 74
End: 2019-10-08
Payer: MEDICARE

## 2019-10-08 DIAGNOSIS — Z23 ENCOUNTER FOR IMMUNIZATION: ICD-10-CM

## 2019-10-08 PROCEDURE — 90662 IIV NO PRSV INCREASED AG IM: CPT

## 2019-10-08 PROCEDURE — G0008: CPT

## 2019-12-10 ENCOUNTER — APPOINTMENT (OUTPATIENT)
Dept: INTERNAL MEDICINE | Facility: CLINIC | Age: 74
End: 2019-12-10
Payer: MEDICARE

## 2019-12-10 VITALS
HEIGHT: 70 IN | RESPIRATION RATE: 15 BRPM | TEMPERATURE: 97.9 F | BODY MASS INDEX: 26.92 KG/M2 | HEART RATE: 67 BPM | WEIGHT: 188 LBS | SYSTOLIC BLOOD PRESSURE: 128 MMHG | DIASTOLIC BLOOD PRESSURE: 82 MMHG | OXYGEN SATURATION: 100 %

## 2019-12-10 DIAGNOSIS — S22.41XS MULTIPLE FRACTURES OF RIBS, RIGHT SIDE, SEQUELA: ICD-10-CM

## 2019-12-10 DIAGNOSIS — K21.9 GASTRO-ESOPHAGEAL REFLUX DISEASE W/OUT ESOPHAGITIS: ICD-10-CM

## 2019-12-10 DIAGNOSIS — E78.5 HYPERLIPIDEMIA, UNSPECIFIED: ICD-10-CM

## 2019-12-10 DIAGNOSIS — G47.00 INSOMNIA, UNSPECIFIED: ICD-10-CM

## 2019-12-10 DIAGNOSIS — K22.70 BARRETT'S ESOPHAGUS W/OUT DYSPLASIA: ICD-10-CM

## 2019-12-10 DIAGNOSIS — M19.019 PRIMARY OSTEOARTHRITIS, UNSPECIFIED SHOULDER: ICD-10-CM

## 2019-12-10 PROCEDURE — 99215 OFFICE O/P EST HI 40 MIN: CPT | Mod: 25

## 2019-12-10 PROCEDURE — G0444 DEPRESSION SCREEN ANNUAL: CPT | Mod: 59

## 2019-12-10 RX ORDER — ZOLPIDEM TARTRATE 5 MG/1
5 TABLET ORAL
Qty: 30 | Refills: 1 | Status: DISCONTINUED | COMMUNITY
Start: 2019-06-18 | End: 2019-12-10

## 2019-12-10 RX ORDER — ZOLPIDEM TARTRATE 12.5 MG/1
12.5 TABLET, EXTENDED RELEASE ORAL
Qty: 30 | Refills: 3 | Status: DISCONTINUED | COMMUNITY
Start: 2019-01-22 | End: 2019-12-10

## 2019-12-10 RX ORDER — TRAMADOL HYDROCHLORIDE 50 MG/1
50 TABLET, COATED ORAL
Qty: 120 | Refills: 0 | Status: DISCONTINUED | COMMUNITY
Start: 2018-11-23 | End: 2019-12-10

## 2019-12-10 RX ORDER — ZOLPIDEM TARTRATE 12.5 MG/1
12.5 TABLET, EXTENDED RELEASE ORAL
Qty: 30 | Refills: 1 | Status: DISCONTINUED | COMMUNITY
Start: 2018-12-06 | End: 2019-12-10

## 2019-12-10 RX ORDER — SULFAMETHOXAZOLE AND TRIMETHOPRIM 400; 80 MG/1; MG/1
400-80 TABLET ORAL DAILY
Refills: 0 | Status: DISCONTINUED | COMMUNITY
Start: 2018-12-03 | End: 2019-12-10

## 2019-12-10 RX ORDER — AZITHROMYCIN 250 MG/1
250 TABLET, FILM COATED ORAL
Qty: 1 | Refills: 1 | Status: DISCONTINUED | COMMUNITY
Start: 2019-07-16 | End: 2019-12-10

## 2019-12-10 RX ORDER — CLONAZEPAM 0.5 MG/1
0.5 TABLET ORAL
Qty: 30 | Refills: 0 | Status: ACTIVE | COMMUNITY
Start: 2019-12-10 | End: 1900-01-01

## 2019-12-10 NOTE — HISTORY OF PRESENT ILLNESS
[FreeTextEntry1] : Comes to the office for followup to review his medications and discuss his overall health patient's main complaints have been occasional insomnia vertigo and arthritis pains [de-identified] : 74-year-old man with a history of GERD and Cabrera's esophagus insomnia hyperlipidemia arthritis of the shoulders recently undergoing surgery previous fall she was correcting for ribs comes to the office for follow patient will be moving to [place] next week he is doing very well his shoulders with almost complete movement with very little pain he denies temperature chills sweats or myalgias he's had no headache sinus congestion sore throat cough wheezing pleurisy chest pain shortness of breath exertional dyspnea lightheadedness palpitations dizziness vertigo or syncope he has had some recent positional vertigo which is a chronic condition for him at times denies abdominal pain has rare reflux denies dysphasia nausea vomiting diarrhea constipation bright red blood per rectum or black stools appetite has been good his weight has been stable he gets up occasionally at night she urinates but denies dysuria or gross hematuria he has had no leg edema or any recent skin rashes

## 2019-12-10 NOTE — HEALTH RISK ASSESSMENT
[No] : No [No falls in past year] : Patient reported no falls in the past year [0] : 2) Feeling down, depressed, or hopeless: Not at all (0) [] : No [[id number]] : 0

## 2019-12-10 NOTE — PHYSICAL EXAM
[Well Developed] : well developed [Well Nourished] : well nourished [No Acute Distress] : no acute distress [Well-Appearing] : well-appearing [Normal Sclera/Conjunctiva] : normal sclera/conjunctiva [Normal Voice/Communication] : normal voice/communication [PERRL] : pupils equal round and reactive to light [EOMI] : extraocular movements intact [Normal Oropharynx] : the oropharynx was normal [Normal Outer Ear/Nose] : the outer ears and nose were normal in appearance [Normal TMs] : both tympanic membranes were normal [Normal Nasal Mucosa] : the nasal mucosa was normal [No JVD] : no jugular venous distention [No Lymphadenopathy] : no lymphadenopathy [Thyroid Normal, No Nodules] : the thyroid was normal and there were no nodules present [Supple] : supple [Normal Percussion] : the chest was normal to percussion [No Accessory Muscle Use] : no accessory muscle use [No Respiratory Distress] : no respiratory distress  [Normal Rate] : normal rate  [Normal S1, S2] : normal S1 and S2 [No Murmur] : no murmur heard [Regular Rhythm] : with a regular rhythm [No Carotid Bruits] : no carotid bruits [No Abdominal Bruit] : a ~M bruit was not heard ~T in the abdomen [Pedal Pulses Present] : the pedal pulses are present [No Varicosities] : no varicosities [No Palpable Aorta] : no palpable aorta [No Edema] : there was no peripheral edema [No Extremity Clubbing/Cyanosis] : no extremity clubbing/cyanosis [Declined Breast Exam] : declined breast exam  [Soft] : abdomen soft [Non Tender] : non-tender [Non-distended] : non-distended [No Masses] : no abdominal mass palpated [No HSM] : no HSM [Normal Bowel Sounds] : normal bowel sounds [No Hernias] : no hernias [Declined Rectal Exam] : declined rectal exam [Normal Supraclavicular Nodes] : no supraclavicular lymphadenopathy [Normal Axillary Nodes] : no axillary lymphadenopathy [Normal Posterior Cervical Nodes] : no posterior cervical lymphadenopathy [Normal Anterior Cervical Nodes] : no anterior cervical lymphadenopathy [Normal Inguinal Nodes] : no inguinal lymphadenopathy [Normal Femoral Nodes] : no femoral lymphadenopathy [No CVA Tenderness] : no CVA  tenderness [No Spinal Tenderness] : no spinal tenderness [Grossly Normal Strength/Tone] : grossly normal strength/tone [No Rash] : no rash [No Joint Swelling] : no joint swelling [No Skin Lesions] : no skin lesions [Coordination Grossly Intact] : coordination grossly intact [Normal Gait] : normal gait [No Focal Deficits] : no focal deficits [Memory Grossly Normal] : memory grossly normal [Speech Grossly Normal] : speech grossly normal [Deep Tendon Reflexes (DTR)] : deep tendon reflexes were 2+ and symmetric [Normal Affect] : the affect was normal [Alert and Oriented x3] : oriented to person, place, and time [Normal Mood] : the mood was normal [Normal Insight/Judgement] : insight and judgment were intact [Acne] : no acne [Scoliosis] : no scoliosis [Kyphosis] : no kyphosis

## 2019-12-10 NOTE — ASSESSMENT
[FreeTextEntry1] : Physical exam shows a well-developed man in no acute distress blood pressure 128/82 height 5 feet 10 inches weight 188 pounds BMI 26.98 heart rate is 67 respirations of 15 HEENT is unremarkable chest was clear cardiovascular exam was regular with no extra heart sounds or murmurs abdomen was soft and nontender extremities showed no clubbing cyanosis or edema neurologic exam was nonfocal patient will obtain blood test when he arrives in [place] with his new physician his records will be forwarded to the new physician once his fax number is supplied patient was told that until he sees his new physician if he runs out of any of his medications to feel free to contact me he is up-to-date with his ophthalmologist and dermatologist and had his last upper endoscopy and colonoscopy in 2018

## 2019-12-10 NOTE — REVIEW OF SYSTEMS
[Postnasal Drip] : postnasal drip [Nocturia] : nocturia [Joint Pain] : joint pain [Joint Stiffness] : joint stiffness [Insomnia] : insomnia [Anxiety] : anxiety [Depression] : depression [Fever] : no fever [Fatigue] : no fatigue [Chills] : no chills [Recent Change In Weight] : ~T no recent weight change [Night Sweats] : no night sweats [Hot Flashes] : no hot flashes [Discharge] : no discharge [Pain] : no pain [Redness] : no redness [Dryness] : no dryness [Vision Problems] : no vision problems [Hearing Loss] : no hearing loss [Earache] : no earache [Itching] : no itching [Nasal Discharge] : no nasal discharge [Nosebleeds] : no nosebleeds [Sore Throat] : no sore throat [Hoarseness] : no hoarseness [Chest Pain] : no chest pain [Claudication] : no  leg claudication [Palpitations] : no palpitations [Lower Ext Edema] : no lower extremity edema [Wheezing] : no wheezing [Orthopena] : no orthopnea [Shortness Of Breath] : no shortness of breath [Dyspnea on Exertion] : not dyspnea on exertion [Abdominal Pain] : no abdominal pain [Cough] : no cough [Constipation] : no constipation [Nausea] : no nausea [Diarrhea] : no diarrhea [Vomiting] : no vomiting [Dysuria] : no dysuria [Heartburn] : no heartburn [Melena] : no melena [Incontinence] : no incontinence [Hesitancy] : no hesitancy [Frequency] : no frequency [Hematuria] : no hematuria [Impotence] : no impotency [Poor Libido] : libido not poor [Muscle Pain] : no muscle pain [Back Pain] : no back pain [Muscle Weakness] : no muscle weakness [Joint Swelling] : no joint swelling [Nail Changes] : no nail changes [Itching] : no itching [Mole Changes] : no mole changes [Skin Rash] : no skin rash [Hair Changes] : no hair changes [Headache] : no headache [Dizziness] : no dizziness [Unsteady Walk] : no ataxia [Fainting] : no fainting [Confusion] : no confusion [Suicidal] : not suicidal [Memory Loss] : no memory loss [Easy Bleeding] : no easy bleeding [Swollen Glands] : no swollen glands [Easy Bruising] : no easy bruising [FreeTextEntry9] : shoulders

## 2019-12-16 ENCOUNTER — RX RENEWAL (OUTPATIENT)
Age: 74
End: 2019-12-16

## 2020-07-23 ENCOUNTER — RX RENEWAL (OUTPATIENT)
Age: 75
End: 2020-07-23

## 2020-12-16 PROBLEM — Z12.11 ENCOUNTER FOR SCREENING COLONOSCOPY: Status: RESOLVED | Noted: 2018-05-16 | Resolved: 2020-12-16

## 2021-01-24 NOTE — ED CDU PROVIDER NOTE - SKIN [+], MLM
[facility] ED NOTE    Patient : Ping Peck Age: 48 year old Sex: female   MRN: [id number] Encounter Date: 1/23/2021    History of Present Illness      Chief Complaint   Patient presents with   • Vaginal Problem     Patient is a 48-year-old female who presents for evaluation of labial pain and swelling.  Patient states this has been going on over the past 2 days.  Patient states the external labia is swollen.  Patient states she was admitted last week for a perirectal abscess with fistula and had a surgical incision and drainage.  Patient states she was discharged home and on antibiotics.  Patient states she finished her last antibiotic today.  Patient has been having fevers at home with a T-max of 101.  Patient reports last fever was 2 days ago.  Patient spoke with her PCP today who recommended her come to the ED for evaluation.  Patient denies any nausea or vomiting.  No abdominal pain.  No cough.          Review of Systems   Constitutional: Positive for chills and fever.   HENT: Negative for ear pain, rhinorrhea, sinus pain and sore throat.    Eyes: Negative for pain and visual disturbance.   Respiratory: Negative for cough and shortness of breath.    Cardiovascular: Negative for chest pain, palpitations and leg swelling.   Gastrointestinal: Negative for abdominal pain, constipation, diarrhea, nausea and vomiting.   Genitourinary: Positive for vaginal pain. Negative for dysuria, flank pain, frequency, hematuria and urgency.   Musculoskeletal: Negative for back pain, neck pain and neck stiffness.   Skin: Negative for rash.   Neurological: Negative for dizziness, weakness, light-headedness, numbness and headaches.       Past Medical History     Allergies   Allergen Reactions   • Latex HIVES, RASH and PRURITUS   • Compazine ANXIETY   • Reglan ANXIETY       Past Medical History:   Diagnosis Date   • Arthritis    • Crohn disease (CMS/[facility])    • Crohn's disease (CMS/[facility]) 01/01/2010   • History of colon  resection    • Migraines    • RAD (reactive airway disease)        Past Surgical History:   Procedure Laterality Date   • APPENDECTOMY     • CHOLECYSTECTOMY     • COLONOSCOPY W BIOPSY  05/07/2010    chronic quiescent colitis w hx of Crohn's   • HYSTERECTOMY         Family History   Problem Relation Age of Onset   • Cancer, Kidney Neg Hx    • Cancer, Prostate Neg Hx        Social History     Tobacco Use   • Smoking status: Never Smoker   • Smokeless tobacco: Never Used   Substance Use Topics   • Alcohol use: Never     Frequency: Never   • Drug use: Yes     Types: Marijuana       Physical Exam     ED Triage Vitals [01/23/21 1750]   ED Triage Vitals Group      Temp 97.4 °F (36.3 °C)      Heart Rate 98      Resp 16      /71      SpO2 95 %      EtCO2 mmHg       Height 5' 2\" (1.575 m)      Weight 171 lb 15.3 oz (78 kg)      Weight Scale Used       BMI (Calculated) 31.45      IBW/kg (Calculated) 50.1     Pulse oximetry reviewed and is normal for patient.      Physical Exam  Vitals signs and nursing note reviewed. Exam conducted with a chaperone present.   Constitutional:       Appearance: Normal appearance.   HENT:      Head: Normocephalic and atraumatic.      Right Ear: Tympanic membrane, ear canal and external ear normal.      Left Ear: Tympanic membrane, ear canal and external ear normal.      Nose: Nose normal.      Mouth/Throat:      Mouth: Mucous membranes are moist.      Pharynx: Oropharynx is clear.   Eyes:      General: Lids are normal. Vision grossly intact.      Extraocular Movements: Extraocular movements intact.      Conjunctiva/sclera: Conjunctivae normal.   Neck:      Musculoskeletal: Full passive range of motion without pain. No neck rigidity.   Cardiovascular:      Rate and Rhythm: Normal rate and regular rhythm.      Pulses: Normal pulses.      Heart sounds: Normal heart sounds.   Pulmonary:      Effort: Pulmonary effort is normal. No tachypnea.      Breath sounds: Normal breath sounds. No wheezing.    Abdominal:      General: Bowel sounds are normal. There is no distension.      Palpations: Abdomen is soft. There is no mass.      Tenderness: There is no abdominal tenderness. There is no right CVA tenderness, left CVA tenderness or rebound.      Hernia: No hernia is present.   Genitourinary:         Comments: Well-healing left buttock/perirectal abscess without any surrounding fluctuance or erythema.  Mild erythema of bilateral external labia with mild white discharge noted consistent with vulvovaginitis.  Musculoskeletal:         General: No swelling, tenderness or deformity.   Lymphadenopathy:      Cervical: No cervical adenopathy.   Skin:     General: Skin is warm and dry.      Capillary Refill: Capillary refill takes less than 2 seconds.      Findings: No rash.   Neurological:      Mental Status: She is alert and oriented to person, place, and time.      Cranial Nerves: Cranial nerves are intact.   Psychiatric:         Attention and Perception: Attention and perception normal.         Mood and Affect: Mood and affect normal.         Speech: Speech normal.         Behavior: Behavior normal. Behavior is cooperative.         Thought Content: Thought content normal.         Judgment: Judgment normal.         ED Course          Procedures    ED Medication Orders (From admission, onward)    Ordered Start     Status Ordering Provider    01/23/21 2202 01/23/21 2215  diphenhydrAMINE (BENADRYL) injection 25 mg  ONCE      Last MAR action: Given CECY EWING    01/23/21 1959 01/23/21 2000  sodium chloride (NORMAL SALINE) 0.9 % bolus 1,000 mL  ONCE      Last MAR action: New Bag CECY EWING    01/23/21 1959 01/23/21 2000  morphine injection 4 mg  ONCE      Last MAR action: Given CECY EWING          Vitals:    01/23/21 2044 01/23/21 2148 01/23/21 2218 01/23/21 2247   BP:  126/83 121/78 115/79   Pulse:       Resp: 16      Temp:       SpO2:  96% 99% 96%   Weight:       Height:           Lab Results      Results for orders placed or performed during the hospital encounter of 01/23/21   Comprehensive Metabolic Panel   Result Value Ref Range    Fasting Status      Sodium 142 135 - 145 mmol/L    Potassium 3.7 3.4 - 5.1 mmol/L    Chloride 105 98 - 107 mmol/L    Carbon Dioxide 31 21 - 32 mmol/L    Anion Gap 10 10 - 20 mmol/L    Glucose 82 65 - 99 mg/dL    BUN 10 6 - 20 mg/dL    Creatinine 0.63 0.51 - 0.95 mg/dL    Glomerular Filtration Rate >90 >90 mL/min/1.73m2    BUN/ Creatinine Ratio 16 7 - 25    Calcium 9.0 8.4 - 10.2 mg/dL    Bilirubin, Total 0.1 (L) 0.2 - 1.0 mg/dL    GOT/AST 27 <=37 Units/L    GPT/ALT 55 <64 Units/L    Alkaline Phosphatase 81 45 - 117 Units/L    Albumin 3.8 3.6 - 5.1 g/dL    Protein, Total 7.2 6.4 - 8.2 g/dL    Globulin 3.4 2.0 - 4.0 g/dL    A/G Ratio 1.1 1.0 - 2.4   Urinalysis & Reflex Microscopy With Culture If Indicated   Result Value Ref Range    COLOR, URINALYSIS Yellow     APPEARANCE, URINALYSIS Clear     GLUCOSE, URINALYSIS Negative Negative mg/dL    BILIRUBIN, URINALYSIS Negative Negative    KETONES, URINALYSIS Negative Negative mg/dL    SPECIFIC GRAVITY, URINALYSIS 1.010 1.005 - 1.030    OCCULT BLOOD, URINALYSIS Moderate (A) Negative    PH, URINALYSIS 6.5 5.0 - 7.0    PROTEIN, URINALYSIS Negative Negative mg/dL    UROBILINOGEN, URINALYSIS 0.2 0.2, 1.0 mg/dL    NITRITE, URINALYSIS Negative Negative    LEUKOCYTE ESTERASE, URINALYSIS Small (A) Negative    SQUAMOUS EPITHELIAL, URINALYSIS 11 to 25 (A) None Seen, 1 to 5 /hpf    ERYTHROCYTES, URINALYSIS 11 to 25 (A) None Seen, 1 to 2 /hpf    LEUKOCYTES, URINALYSIS 6 to 10 (A) None Seen, 1 to 5 /hpf    BACTERIA, URINALYSIS Moderate (A) None Seen /hpf    HYALINE CASTS, URINALYSIS None Seen None Seen, 1 to 5 /lpf   CBC with Automated Differential (performable only)   Result Value Ref Range    WBC 10.0 4.2 - 11.0 K/mcL    RBC 4.10 4.00 - 5.20 mil/mcL    HGB 13.0 12.0 - 15.5 g/dL    HCT 41.0 36.0 - 46.5 %    .0 78.0 - 100.0 fl    MCH 31.7  26.0 - 34.0 pg    MCHC 31.7 (L) 32.0 - 36.5 g/dL    RDW-CV 12.8 11.0 - 15.0 %    RDW-SD 47.2 39.0 - 50.0 fL     140 - 450 K/mcL    NRBC 0 <=0 /100 WBC    Neutrophil, Percent 62 %    Lymphocytes, Percent 32 %    Mono, Percent 5 %    Eosinophils, Percent 1 %    Basophils, Percent 0 %    Immature Granulocytes 0 %    Absolute Neutrophils 6.2 1.8 - 7.7 K/mcL    Absolute Lymphocytes 3.2 1.0 - 4.8 K/mcL    Absolute Monocytes 0.5 0.3 - 0.9 K/mcL    Absolute Eosinophils  0.1 0.0 - 0.5 K/mcL    Absolute Basophils 0.0 0.0 - 0.3 K/mcL    Absolute Immmature Granulocytes 0.0 0.0 - 0.2 K/mcL   Lactic Acid, Venous   Result Value Ref Range    Lactate, Venous 1.4 0.0 - 2.0 mmol/L   WET MOUNT   Result Value Ref Range    CLUE CELLS, WET MOUNT None Seen None Seen    TRICHOMONAS, WET MOUNT None Seen None Seen    YEAST, WET MOUNT None Seen None Seen   Blood Culture    Specimen: Blood   Result Value Ref Range    Culture, Blood or Bone Marrow No Growth <24 hours    Blood Culture    Specimen: Blood   Result Value Ref Range    Culture, Blood or Bone Marrow No Growth <24 hours        EKG Results       Radiology Results     Imaging Results          CT PELVIS W CONTRAST (Final result)  Result time 01/23/21 22:00:01    Final result                 Impression:       Interval decrease in left perianal soft tissue swelling with a small focus  of subcutaneous air, possibly due to recent surgery. No defined fluid  collection.     Additional findings as above.    Electronically Signed by: GABRIELLE CAMEJO MD   Signed on: 1/23/2021 10:00 PM                Narrative:    PROCEDURE:  CT PELVIS W CONTRAST    CLINICAL INDICATION:  Rectal pain status post rectal abscess surgery 4 days  ago.    TECHNIQUE:  Helical CT of the pelvis was performed using non-ionic  intravenous contrast.  No oral contrast was administered. The mA and/or kV  was adjusted for patient size.    CONTRAST: 75 mL Omnipaque 350    COMPARISON:  Prior study dated  01/13/2021.    FINDINGS:     There has been interval decrease in left perianal and perineal soft tissue  swelling, now with a small focus of subcutaneous air in the left perianal  region. No defined fluid collection is identified. There is no discrete  fistula or radiopaque foreign body.      There is a surgical clip adjacent to the rectum. The visualized  rectosigmoid is decompressed. No significant bowel wall thickening is  identified. Fluid-filled loops of small bowel are noted in the pelvis.  Suture material is seen at the enterocolic anastomosis in the right lower  quadrant. No fluid collection is seen and there is no pneumoperitoneum.  There is no bowel obstruction.    The bladder is mildly distended. The uterus is absent. An additional clip  is identified in the left anterior pelvis, stable from the prior. No large  adnexal lesions are seen.    The pelvic vasculature is unremarkable with only minimal atherosclerotic  vascular calcifications. No adenopathy is seen. The osseous structures are  unremarkable.                                  Medical Decision Making      MDM  Number of Diagnoses or Management Options  Vulvovaginitis:   Diagnosis management comments: UTI, vulvovaginitis, perirectal abscess, rectal abscess, fistula, Bartholin's gland cyst, cellulitis, Gladys's gangrene    Patient is a 48-year-old female with history and PE as above.  Patient presents with irritation of external labia with burning.  Has had no fevers in the past 48 hours.  Patient's urine is not concerning for infection.  Patient's left buttock abscess appears to be healing well.  No fluctuance or erythema in the area.  Patient's labs have no leukocytosis or left shift.  Patient's exam and vitals are reassuring.  Wet prep negative.  Patient has no concern for any STDs.  Patient's CT scan of pelvis shows improved swelling in the area of recent I&D.  There is some area where she had a incision and drainage by general surgery.  Case  discussed with attending, Dr. Pinedo also examined patient and agrees with symptoms.  Urine will be sent for culture.  Patient discharged home with Rx for Diflucan and clotrimazole.    I explained to the patient that in the emergency department evaluation is not exhaustive it is important to follow-up with a primary care physician or specialist as discussed, and to return to the emergency department if symptoms are not improving or are worsening.  The patient verbalized understanding of these follow-up instructions and return precautions.           The patient was masked, and I was wearing a LPR mask, gloves, and face shield during entire patient encounter.    Clinical Impression     ED Diagnosis   1. Vulvovaginitis         Disposition        Discharge 1/23/2021 10:30 PM  Ping Peck discharge to home/self care.          Wyatt Ayala PA-C   1/24/2021 6:30 PM          Wyatt Ayala PA-C  01/24/21 1356     Hematoma

## 2021-01-29 ENCOUNTER — RX RENEWAL (OUTPATIENT)
Age: 76
End: 2021-01-29

## 2021-07-29 ENCOUNTER — RX RENEWAL (OUTPATIENT)
Age: 76
End: 2021-07-29

## 2023-12-04 NOTE — ED ADULT NURSE NOTE - NSSISCREENINGQ2_ED_A_ED
Hospitalist Trauma Surgery Hospitalist SICU SICU Trauma Surgery Hospitalist Trauma Surgery Hospitalist Surgery No
